# Patient Record
Sex: FEMALE | Race: BLACK OR AFRICAN AMERICAN | Employment: UNEMPLOYED | URBAN - METROPOLITAN AREA
[De-identification: names, ages, dates, MRNs, and addresses within clinical notes are randomized per-mention and may not be internally consistent; named-entity substitution may affect disease eponyms.]

---

## 2018-02-14 ENCOUNTER — IP HISTORICAL/CONVERTED ENCOUNTER (OUTPATIENT)
Dept: OTHER | Age: 53
End: 2018-02-14

## 2019-08-13 ENCOUNTER — ED HISTORICAL/CONVERTED ENCOUNTER (OUTPATIENT)
Dept: OTHER | Age: 54
End: 2019-08-13

## 2020-01-17 ENCOUNTER — ED HISTORICAL/CONVERTED ENCOUNTER (OUTPATIENT)
Dept: OTHER | Age: 55
End: 2020-01-17

## 2022-07-20 ENCOUNTER — OFFICE VISIT (OUTPATIENT)
Dept: OBGYN CLINIC | Age: 57
End: 2022-07-20
Payer: COMMERCIAL

## 2022-07-20 VITALS
TEMPERATURE: 97.1 F | BODY MASS INDEX: 26.63 KG/M2 | OXYGEN SATURATION: 98 % | HEIGHT: 66 IN | WEIGHT: 165.7 LBS | DIASTOLIC BLOOD PRESSURE: 104 MMHG | HEART RATE: 85 BPM | RESPIRATION RATE: 18 BRPM | SYSTOLIC BLOOD PRESSURE: 150 MMHG

## 2022-07-20 DIAGNOSIS — Z12.31 ENCOUNTER FOR SCREENING MAMMOGRAM FOR MALIGNANT NEOPLASM OF BREAST: ICD-10-CM

## 2022-07-20 DIAGNOSIS — Z01.419 ENCOUNTER FOR GYNECOLOGICAL EXAMINATION WITHOUT ABNORMAL FINDING: Primary | ICD-10-CM

## 2022-07-20 DIAGNOSIS — Z11.3 VENEREAL DISEASE SCREENING: ICD-10-CM

## 2022-07-20 DIAGNOSIS — Z72.0 TOBACCO USER: ICD-10-CM

## 2022-07-20 DIAGNOSIS — B96.89 BACTERIAL VAGINOSIS: ICD-10-CM

## 2022-07-20 DIAGNOSIS — N89.8 VAGINAL PRURITUS: ICD-10-CM

## 2022-07-20 DIAGNOSIS — N76.0 BACTERIAL VAGINOSIS: ICD-10-CM

## 2022-07-20 DIAGNOSIS — Z12.4 SCREENING FOR CERVICAL CANCER: ICD-10-CM

## 2022-07-20 DIAGNOSIS — Z12.11 ENCOUNTER FOR SCREENING COLONOSCOPY: ICD-10-CM

## 2022-07-20 PROCEDURE — 99213 OFFICE O/P EST LOW 20 MIN: CPT | Performed by: OBSTETRICS & GYNECOLOGY

## 2022-07-20 RX ORDER — AMLODIPINE BESYLATE 5 MG/1
5 TABLET ORAL DAILY
COMMUNITY

## 2022-07-20 NOTE — PROGRESS NOTES
HPI: Festus Cotton is a 61 y.o. female [id number], menopausal, who presents today for the following:  Chief Complaint   Patient presents with    Annual Exam        She denies abnormal vaginal bleeding, abnormal vaginal discharge or vaginal odor. Mild vaginal/vulvar pruritus. Denies h/o STIs or abnormal pap smears. Last mammogram: 2 yrs ago. Last colonoscopy: 8-9 yrs ago, neg per pt. Past Medical History:   Diagnosis Date    Anxiety     Chest pain     stress Echo  normal May 2013 in [facility]    Disc herniation 06/06/2013    Noted on MRI, C3-C6, assoc with stenosis but no cord compression    HTN (hypertension)     Hypertensive urgency     Migraine        History reviewed. No pertinent surgical history.     Family History   Problem Relation Age of Onset    Cancer Mother         brain    Stroke Mother     Hypertension Mother     Hypertension Father     Cancer Sister     Cancer Sister         breast    Hypertension Sister     Hypertension Sister        Social History     Socioeconomic History    Marital status:      Spouse name: Not on file    Number of children: Not on file    Years of education: Not on file    Highest education level: 12th grade   Occupational History    Occupation:    Tobacco Use    Smoking status: Every Day     Packs/day: 0.25     Years: 20.00     Pack years: 5.00     Types: Cigarettes    Smokeless tobacco: Never   Vaping Use    Vaping Use: Never used   Substance and Sexual Activity    Alcohol use: Yes     Comment: rarely    Drug use: No    Sexual activity: Yes     Partners: Male     Comment: denies h/o STIs or abnml pap smears   Other Topics Concern    Not on file   Social History Narrative    Not on file     Social Determinants of Health     Financial Resource Strain: Not on file   Food Insecurity: Not on file   Transportation Needs: Not on file   Physical Activity: Sufficiently Active    Days of Exercise per Week: 4 days    Minutes of Exercise per Session: 60 min   Stress: Not on file   Social Connections: Not on file   Intimate Partner Violence: Not on file   Housing Stability: Not on file       Allergies   Allergen Reactions    Tetracycline Hives          Current Outpatient Medications:     amLODIPine (NORVASC) 5 mg tablet, Take 5 mg by mouth in the morning., Disp: , Rfl:     aspirin 81 mg chewable tablet, Take 1 Tab by mouth daily. , Disp: 30 Tab, Rfl: 11    metoprolol succinate (TOPROL-XL) 25 mg XL tablet, Take 25 mg by mouth in the morning., Disp: , Rfl:          Review of Systems: Denies issues with eyes, ears, mouth, nose. Denies fevers/chills. 10 lb weight gain in past year. Denies chest pain, shortness of breath, nausea, vomiting, constipation, diarrhea or abdominal pain. Denies dysuria. Denies muscle aches, weakness, numbness or tingling. Denies issues with breasts. Denies bleeding/clotting d/o's. Denies anxiety/depression, S/HI. OBJECTIVE:  BP (!) 150/104 (BP 1 Location: Left upper arm, BP Patient Position: Sitting, BP Cuff Size: Adult)   Pulse 85   Temp 97.1 °F (36.2 °C) (Temporal)   Resp 18   Ht 5' 6\" (1.676 m)   Wt 165 lb 11.2 oz (75.2 kg)   SpO2 98%   BMI 26.74 kg/m²      Constitutional  Appearance: well-nourished, well developed, alert, in no acute distress    HENT  Head and Face: appears normal    Neck  Inspection/Palpation: normal appearance      Breasts  Symmetric, no palpable masses, no tenderness, no skin changes, no nipple abnormality, no nipple discharge, no axillary or supraclavicular lymphadenopathy.     Chest  Respiratory Effort: normal      Gastrointestinal  Abdominal Examination: abdomen non-tender to palpation, no masses present  Liver and spleen: no hepatomegaly present, spleen not palpable      Genitourinary  External Genitalia: normal appearance for age, no discharge present, no tenderness present, no inflammatory lesions present, no masses present; atrophy present  Vagina: normal vaginal vault without central or paravaginal defects, no discharge present, no inflammatory lesions present, no masses present; atrophic  Bladder: non-tender to palpation  Urethra: appears normal  Cervix: normal, no cervical motion tenderness or abnormal discharge; atrophic; pap smear and swab obtained  Uterus: normal size, shape and consistency  Adnexa: no adnexal tenderness present, no adnexal masses present  Perineum: perineum within normal limits, no evidence of trauma, no rashes or skin lesions present  Anus: anus within normal limits, no hemorrhoids present    Skin  General Inspection: no rash, no lesions identified    Neurologic/Psychiatric  Mental Status:  Orientation: grossly oriented to person, place and time  Mood and Affect: mood normal, affect appropriate          Assessment/plan:    ICD-10-CM ICD-9-CM    1. Encounter for gynecological examination without abnormal finding  Z01.419 V72.31       2. Screening for cervical cancer  Z12.4 V76.2 PAP IG, CT-NG-TV, APTIMA HPV AND RFX 68/04,63([id number])      3. Venereal disease screening  Z11.3 V74.5 PAP IG, CT-NG-TV, APTIMA HPV AND RFX 51/61,17([id number])      4. Encounter for screening mammogram for malignant neoplasm of breast  Z12.31 V76.12 [facility] MAMMO BI SCREENING INCL CAD      5. Encounter for screening colonoscopy  Z12.11 V76.51       6. Tobacco user  Z72.0 305.1       7. Vaginal pruritus  N89.8 698.1 NUSWAB VAGINOSIS + CANDIDA           -Annual gynecologic exam.    -Cervical cancer screening- pap smear and HPV co testing obtained today. -Breast cancer screening- breast awareness discussed; mammograms yearly. -STI screening-accepts testing: G/C/T ordered, declines bloodwork.     -Colon cancer screening- colonoscopy up to date per pt, she will verify.     -Bone health-discussed weightbearing exercises, vitamin D and calcium supplementation.    -Vaginal atrophy- discussed vaginal estrogen vs non estrofgen products such as Replens. She will try the latter.     -Tobacco cessation discussed.

## 2022-07-20 NOTE — PROGRESS NOTES
Chief Complaint   Patient presents with    Annual Exam     1. \"Have you been to the ER, urgent care clinic since your last visit? Hospitalized since your last visit? \" Yes Where: [place]    2. \"Have you seen or consulted any other health care providers outside of the [address] [place] [address] since your last visit? \" No     3. For patients aged 39-70: Has the patient had a colonoscopy? Yes - no Care Gap present     If the patient is female:    4. For patients aged 41-77: Has the patient had a mammogram within the past 2 years? Yes - Care Gap present. Rooming MA/LPN to request most recent results    5. For patients aged 21-65: Has the patient had a pap smear?  Yes - no Care Gap present    Visit Vitals  BP (!) 150/104 (BP 1 Location: Left upper arm, BP Patient Position: Sitting, BP Cuff Size: Adult)   Pulse 85   Temp 97.1 °F (36.2 °C) (Temporal)   Resp 18   Ht 5' 6\" (1.676 m)   Wt 165 lb 11.2 oz (75.2 kg)   SpO2 98%   BMI 26.74 kg/m²

## 2022-07-22 LAB
A VAGINAE DNA VAG QL NAA+PROBE: ABNORMAL SCORE
BVAB2 DNA VAG QL NAA+PROBE: ABNORMAL SCORE
C ALBICANS DNA VAG QL NAA+PROBE: NEGATIVE
C GLABRATA DNA VAG QL NAA+PROBE: NEGATIVE
MEGA1 DNA VAG QL NAA+PROBE: ABNORMAL SCORE

## 2022-07-23 LAB
C TRACH RRNA CVX QL NAA+PROBE: NEGATIVE
CYTOLOGIST CVX/VAG CYTO: NORMAL
CYTOLOGY CVX/VAG DOC CYTO: NORMAL
CYTOLOGY CVX/VAG DOC THIN PREP: NORMAL
DX ICD CODE: NORMAL
HPV I/H RISK 4 DNA CVX QL PROBE+SIG AMP: NEGATIVE
Lab: NORMAL
N GONORRHOEA RRNA CVX QL NAA+PROBE: NEGATIVE
OTHER STN SPEC: NORMAL
STAT OF ADQ CVX/VAG CYTO-IMP: NORMAL
T VAGINALIS RRNA SPEC QL NAA+PROBE: NEGATIVE

## 2022-07-26 RX ORDER — METOPROLOL SUCCINATE 25 MG/1
25 TABLET, EXTENDED RELEASE ORAL DAILY
COMMUNITY
Start: 2022-07-13

## 2022-07-29 PROBLEM — B96.89 BACTERIAL VAGINOSIS: Status: ACTIVE | Noted: 2022-07-29

## 2022-07-29 PROBLEM — N76.0 BACTERIAL VAGINOSIS: Status: ACTIVE | Noted: 2022-07-29

## 2022-07-29 RX ORDER — METRONIDAZOLE 500 MG/1
500 TABLET ORAL EVERY 12 HOURS
Qty: 14 TABLET | Refills: 1 | Status: SHIPPED | OUTPATIENT
Start: 2022-07-29 | End: 2022-08-05

## 2022-07-29 RX ORDER — FLUCONAZOLE 150 MG/1
TABLET ORAL
Qty: 1 TABLET | Refills: 1 | Status: SHIPPED | OUTPATIENT
Start: 2022-07-29

## 2022-07-30 NOTE — PROGRESS NOTES
PORTAL MESSAGE  Lenin Berkowitz,     Your pap smear is negative. Testing for gonorrhea, chlamydia and trichomonas came back negative. Your vaginal swab is showing bacterial vaginosis. I will send in Metronidazole and Fluconazole to treat this.      Thank you,  Dr. Lynne Wu.

## 2022-08-04 ENCOUNTER — HOSPITAL ENCOUNTER (OUTPATIENT)
Dept: MAMMOGRAPHY | Age: 57
Discharge: HOME | End: 2022-08-04
Attending: OBSTETRICS & GYNECOLOGY
Payer: COMMERCIAL

## 2022-08-04 DIAGNOSIS — Z12.31 ENCOUNTER FOR SCREENING MAMMOGRAM FOR MALIGNANT NEOPLASM OF BREAST: ICD-10-CM

## 2022-08-04 PROCEDURE — 77063 BREAST TOMOSYNTHESIS BI: CPT

## 2022-10-26 ENCOUNTER — HOSPITAL ENCOUNTER (EMERGENCY)
Age: 57
Discharge: HOME | End: 2022-10-26
Payer: COMMERCIAL

## 2022-10-26 ENCOUNTER — APPOINTMENT (OUTPATIENT)
Dept: GENERAL RADIOLOGY | Age: 57
End: 2022-10-26
Attending: PHYSICIAN ASSISTANT
Payer: COMMERCIAL

## 2022-10-26 VITALS
HEART RATE: 96 BPM | WEIGHT: 160 LBS | DIASTOLIC BLOOD PRESSURE: 102 MMHG | SYSTOLIC BLOOD PRESSURE: 158 MMHG | BODY MASS INDEX: 25.71 KG/M2 | OXYGEN SATURATION: 100 % | TEMPERATURE: 98.7 F | HEIGHT: 66 IN | RESPIRATION RATE: 16 BRPM

## 2022-10-26 DIAGNOSIS — M54.12 CERVICAL RADICULOPATHY: ICD-10-CM

## 2022-10-26 DIAGNOSIS — M62.838 MUSCLE SPASM: ICD-10-CM

## 2022-10-26 DIAGNOSIS — M50.30 DDD (DEGENERATIVE DISC DISEASE), CERVICAL: Primary | ICD-10-CM

## 2022-10-26 PROCEDURE — 72050 X-RAY EXAM NECK SPINE 4/5VWS: CPT

## 2022-10-26 PROCEDURE — 74011250637 HC RX REV CODE- 250/637: Performed by: PHYSICIAN ASSISTANT

## 2022-10-26 PROCEDURE — 99283 EMERGENCY DEPT VISIT LOW MDM: CPT

## 2022-10-26 RX ORDER — HYDROCODONE BITARTRATE AND ACETAMINOPHEN 5; 325 MG/1; MG/1
1 TABLET ORAL
Qty: 8 TABLET | Refills: 0 | Status: SHIPPED | OUTPATIENT
Start: 2022-10-26 | End: 2022-10-29

## 2022-10-26 RX ORDER — METHOCARBAMOL 750 MG/1
750 TABLET, FILM COATED ORAL
Status: COMPLETED | OUTPATIENT
Start: 2022-10-26 | End: 2022-10-26

## 2022-10-26 RX ORDER — IBUPROFEN 800 MG/1
800 TABLET ORAL ONCE
Status: COMPLETED | OUTPATIENT
Start: 2022-10-26 | End: 2022-10-26

## 2022-10-26 RX ORDER — METHOCARBAMOL 750 MG/1
750 TABLET, FILM COATED ORAL 4 TIMES DAILY
Qty: 20 TABLET | Refills: 0 | Status: SHIPPED | OUTPATIENT
Start: 2022-10-26 | End: 2022-10-31

## 2022-10-26 RX ADMIN — METHOCARBAMOL 750 MG: 750 TABLET ORAL at 21:17

## 2022-10-26 RX ADMIN — IBUPROFEN 800 MG: 800 TABLET, FILM COATED ORAL at 21:17

## 2022-10-26 NOTE — ED TRIAGE NOTES
Patient having pain in back of head that radiates down neck and L side of jaw X2 weeks.  Been taking tylenol at home with no relief

## 2022-10-26 NOTE — Clinical Note
[address] [facility] EMERGENCY DEPT  [facility] [place] [address]  [phone]    Work/School Note    Date: 10/26/2022    To Whom It May concern:    Heber Marinelli was seen and treated today in the emergency room by the following provider(s):  Physician Assistant: Suresh Wilson PA-C. Heber Marinelli is excused from work/school on 10/26/22 and 10/27/22. She is medically clear to return to work/school on 10/28/2022.        Sincerely,          Jaida Villasenor PA-C

## 2022-10-27 NOTE — ED PROVIDER NOTES
EMERGENCY DEPARTMENT HISTORY AND PHYSICAL EXAM      Date: 10/26/2022  Patient Name: Tanika Soares    History of Presenting Illness     Chief Complaint   Patient presents with    Headache       History Provided By: Patient    HPI: Tanika Soares, 61 y.o. female with a past medical history significant for HTN, migraines, and anxiety who presents to this ED with cc of neck pain. Patient reports chronic neck pain which has been worse over the past 7 days. Denies any precipitating injury or trauma and states that she woke up with left-sided pain and tightness. She also complains of developing a gradual onset generalized headache over the past 2 to 3 days. Patient denies treating pain with anything today. Notes no alleviating or exacerbating factors of pain. States that she otherwise well has no further concerns. Patient still denies any fevers, chills, change in vision, lightheadedness, dizziness, numbness, weakness, chest pain, shortness of breath    There are no other complaints, changes, or physical findings at this time. PCP: Ha Rogel MD    No current facility-administered medications on file prior to encounter. Current Outpatient Medications on File Prior to Encounter   Medication Sig Dispense Refill    fluconazole (DIFLUCAN) 150 mg tablet Take 1 tablet by mouth x1 dose to prevent a yeast infection after completing the antibiotics. Repeat dose in 72 hours if symptoms continue. Indications: treatment to prevent vulvovaginal yeast infection 1 Tablet 1    metoprolol succinate (TOPROL-XL) 25 mg XL tablet Take 25 mg by mouth in the morning. amLODIPine (NORVASC) 5 mg tablet Take 5 mg by mouth in the morning. aspirin 81 mg chewable tablet Take 1 Tab by mouth daily.  30 Tab 11       Past History     Past Medical History:  Past Medical History:   Diagnosis Date    Anxiety     Chest pain     stress Echo  normal May 2013 in [facility]    Disc herniation 06/06/2013    Noted on MRI, C3-C6, assoc with stenosis but no cord compression    HTN (hypertension)     Hypertensive urgency     Menopause     Migraine        Past Surgical History:  No past surgical history on file. Family History:  Family History   Problem Relation Age of Onset    Cancer Mother         brain    Stroke Mother     Hypertension Mother     Breast Cancer Sister     Breast Cancer Sister     Cancer Sister     Cancer Sister         breast    Hypertension Sister     Hypertension Sister     Hypertension Father        Social History:  Social History     Tobacco Use    Smoking status: Every Day     Packs/day: 0.25     Years: 20.00     Pack years: 5.00     Types: Cigarettes    Smokeless tobacco: Never   Vaping Use    Vaping Use: Never used   Substance Use Topics    Alcohol use: Yes     Comment: rarely    Drug use: No       Allergies: Allergies   Allergen Reactions    Tetracycline Hives       Review of Systems   Review of Systems   Constitutional: Negative. Negative for chills, diaphoresis and fever. HENT: Negative. Negative for congestion, rhinorrhea and sore throat. Eyes: Negative. Respiratory: Negative. Negative for cough, shortness of breath and wheezing. Cardiovascular: Negative. Negative for chest pain and palpitations. Gastrointestinal: Negative. Negative for abdominal pain, diarrhea, nausea and vomiting. Genitourinary: Negative. Negative for difficulty urinating, dysuria, flank pain, frequency and hematuria. Musculoskeletal:  Positive for neck pain. Skin: Negative. Negative for rash. Neurological:  Positive for headaches. Negative for dizziness, syncope, weakness, light-headedness and numbness. Psychiatric/Behavioral: Negative. All other systems reviewed and are negative. Physical Exam   Physical Exam  Vitals and nursing note reviewed. Constitutional:       General: She is not in acute distress. Appearance: Normal appearance. She is not ill-appearing or toxic-appearing.    HENT:      Head: Normocephalic and atraumatic. Mouth/Throat:      Mouth: Mucous membranes are moist.      Pharynx: Oropharynx is clear. Eyes:      Extraocular Movements: Extraocular movements intact. Conjunctiva/sclera: Conjunctivae normal.      Pupils: Pupils are equal, round, and reactive to light. Pulmonary:      Effort: Pulmonary effort is normal.      Breath sounds: Normal breath sounds. No wheezing, rhonchi or rales. Abdominal:      General: There is no distension. Palpations: Abdomen is soft. Tenderness: There is no abdominal tenderness. There is no guarding or rebound. Musculoskeletal:      Cervical back: Neck supple. Tenderness (left sided paraspinal musculature with palpable trigger points) present. No deformity or bony tenderness. Thoracic back: Normal.      Lumbar back: Normal.      Comments: No step-offs or deformity   Skin:     General: Skin is warm and dry. Capillary Refill: Capillary refill takes less than 2 seconds. Findings: No rash. Neurological:      General: No focal deficit present. Mental Status: She is alert and oriented to person, place, and time. Sensory: Sensation is intact. Motor: Motor function is intact. Gait: Gait is intact. Psychiatric:         Mood and Affect: Mood normal.         Behavior: Behavior normal.       Lab and Diagnostic Study Results   Labs -   No results found for this or any previous visit (from the past 12 hour(s)).     Radiologic Studies -   @lastxrresult@  CT Results  (Last 48 hours)      None          CXR Results  (Last 48 hours)      None            Medical Decision Making and ED Course   Differential Diagnosis & Medical Decision Making Provider Note:   Ddx: Strain, sprain, degenerative disc disease, muscle spasm, cervical radiculopathy, tension headache    Will assess with plain films, control pain, reassess, anticipate discharge with close outpatient orthopedic follow-up          - I am the first provider for this patient. I reviewed the vital signs, available nursing notes, past medical history, past surgical history, family history and social history. The patients presenting problems have been discussed, and they are in agreement with the care plan formulated and outlined with them. I have encouraged them to ask questions as they arise throughout their visit. Vital Signs-Reviewed the patient's vital signs. Patient Vitals for the past 12 hrs:   Temp Pulse Resp BP SpO2   10/26/22 2244 -- -- -- -- 100 %   10/26/22 2230 -- -- -- (!) 158/102 98 %   10/26/22 2145 -- -- -- (!) 140/94 98 %   10/26/22 2130 -- -- -- (!) 157/108 100 %   10/26/22 2037 -- -- -- -- 100 %   10/26/22 2030 -- -- -- (!) 145/102 100 %   10/26/22 2015 -- -- -- (!) 158/106 100 %   10/26/22 1945 -- -- -- (!) 183/110 100 %   10/26/22 1923 98.7 °F (37.1 °C) 96 16 (!) 205/119 100 %       ED Course:   10:25 PM  Patient reassessed and updated on imaging results which do demonstrate degenerative disc disease with left-sided neuroforaminal stenosis worst at C5-C6. Patient states that her pain is better controlled this time. She has been encouraged to schedule close follow-up with orthopedic spine specialist; referral provided at time of discharge. Patient is also educated on strict return precautions and conveys good understanding agree with care plan as outlined. She has no new complaints or concerns and all questions have been answered. Dissipate discharge home shortly. TOBACCO COUNSELING: Upon evaluation, pt expressed that they are a current tobacco user. For approximately 10 minutes, pt has been counseled on the dangers of smoking and was encouraged to quit as soon as possible in order to decrease further risks to their health. Pt has conveyed their understanding of the risks involved should they continue to use tobacco products.   Procedures   Performed by: Waqar Seo PA-C  Procedures      Disposition   Disposition: DC- Adult Discharges: All of the diagnostic tests were reviewed and questions answered. Diagnosis, care plan and treatment options were discussed. The patient understands the instructions and will follow up as directed. The patients results have been reviewed with them. They have been counseled regarding their diagnosis. The patient verbally convey understanding and agreement of the signs, symptoms, diagnosis, treatment and prognosis and additionally agrees to follow up as recommended with their PCP in 24 - 48 hours. They also agree with the care-plan and convey that all of their questions have been answered. I have also put together some discharge instructions for them that include: 1) educational information regarding their diagnosis, 2) how to care for their diagnosis at home, as well a 3) list of reasons why they would want to return to the ED prior to their follow-up appointment, should their condition change. DISCHARGE PLAN:  1. Current Discharge Medication List        CONTINUE these medications which have NOT CHANGED    Details   fluconazole (DIFLUCAN) 150 mg tablet Take 1 tablet by mouth x1 dose to prevent a yeast infection after completing the antibiotics. Repeat dose in 72 hours if symptoms continue. Indications: treatment to prevent vulvovaginal yeast infection  Qty: 1 Tablet, Refills: 1    Associated Diagnoses: Bacterial vaginosis      metoprolol succinate (TOPROL-XL) 25 mg XL tablet Take 25 mg by mouth in the morning. amLODIPine (NORVASC) 5 mg tablet Take 5 mg by mouth in the morning. aspirin 81 mg chewable tablet Take 1 Tab by mouth daily. Qty: 30 Tab, Refills: 11           2.    Follow-up Information       Follow up With Specialties Details Why Contact Info    Milagro Diego MD Orthopedic Surgery Schedule an appointment as soon as possible for a visit    [address]  [phone]      [facility] EMERGENCY DEPT Emergency Medicine  If symptoms worsen [address] [facility] [id number] [phone]          3. Return to ED if worse   4. Discharge Medication List as of 10/26/2022 10:40 PM        START taking these medications    Details   methocarbamoL (Robaxin-750) 750 mg tablet Take 1 Tablet by mouth four (4) times daily for 5 days. , Normal, Disp-20 Tablet, R-0      HYDROcodone-acetaminophen (Norco) 5-325 mg per tablet Take 1 Tablet by mouth every six (6) hours as needed for Pain for up to 3 days. Max Daily Amount: 4 Tablets., Normal, Disp-8 Tablet, R-0           CONTINUE these medications which have NOT CHANGED    Details   fluconazole (DIFLUCAN) 150 mg tablet Take 1 tablet by mouth x1 dose to prevent a yeast infection after completing the antibiotics. Repeat dose in 72 hours if symptoms continue. Indications: treatment to prevent vulvovaginal yeast infection, Normal, Disp-1 Tablet, R-1      metoprolol succinate (TOPROL-XL) 25 mg XL tablet Take 25 mg by mouth in the morning., Historical Med      amLODIPine (NORVASC) 5 mg tablet Take 5 mg by mouth in the morning., Historical Med      aspirin 81 mg chewable tablet Take 1 Tab by mouth daily. , Normal, Disp-30 Tab, R-11           Remove if admitted/transferred    Diagnosis/Clinical Impression     Clinical Impression:   1. DDD (degenerative disc disease), cervical    2. Cervical radiculopathy    3. Muscle spasm        Attestations: Idalmis Villasenor PA-C, am the primary clinician of record. Please note that this dictation was completed with [employer], the computer voice recognition software. Quite often unanticipated grammatical, syntax, homophones, and other interpretive errors are inadvertently transcribed by the computer software. Please disregard these errors. Please excuse any errors that have escaped final proofreading. Thank you.

## 2023-01-05 ENCOUNTER — HOSPITAL ENCOUNTER (EMERGENCY)
Age: 58
Discharge: HOME | End: 2023-01-05
Attending: STUDENT IN AN ORGANIZED HEALTH CARE EDUCATION/TRAINING PROGRAM
Payer: COMMERCIAL

## 2023-01-05 ENCOUNTER — APPOINTMENT (OUTPATIENT)
Dept: CT IMAGING | Age: 58
End: 2023-01-05
Payer: COMMERCIAL

## 2023-01-05 VITALS
HEART RATE: 85 BPM | TEMPERATURE: 98.2 F | HEIGHT: 66 IN | SYSTOLIC BLOOD PRESSURE: 144 MMHG | DIASTOLIC BLOOD PRESSURE: 86 MMHG | RESPIRATION RATE: 18 BRPM | WEIGHT: 157 LBS | BODY MASS INDEX: 25.23 KG/M2 | OXYGEN SATURATION: 100 %

## 2023-01-05 DIAGNOSIS — R51.9 NONINTRACTABLE EPISODIC HEADACHE, UNSPECIFIED HEADACHE TYPE: Primary | ICD-10-CM

## 2023-01-05 PROCEDURE — 99284 EMERGENCY DEPT VISIT MOD MDM: CPT

## 2023-01-05 PROCEDURE — 96374 THER/PROPH/DIAG INJ IV PUSH: CPT

## 2023-01-05 PROCEDURE — 36415 COLL VENOUS BLD VENIPUNCTURE: CPT

## 2023-01-05 PROCEDURE — 70450 CT HEAD/BRAIN W/O DYE: CPT

## 2023-01-05 PROCEDURE — 74011250636 HC RX REV CODE- 250/636

## 2023-01-05 PROCEDURE — 96375 TX/PRO/DX INJ NEW DRUG ADDON: CPT

## 2023-01-05 RX ORDER — KETOROLAC TROMETHAMINE 15 MG/ML
15 INJECTION, SOLUTION INTRAMUSCULAR; INTRAVENOUS
Status: COMPLETED | OUTPATIENT
Start: 2023-01-05 | End: 2023-01-05

## 2023-01-05 RX ORDER — DIPHENHYDRAMINE HYDROCHLORIDE 50 MG/ML
25 INJECTION, SOLUTION INTRAMUSCULAR; INTRAVENOUS
Status: COMPLETED | OUTPATIENT
Start: 2023-01-05 | End: 2023-01-05

## 2023-01-05 RX ORDER — BUTALBITAL, ACETAMINOPHEN AND CAFFEINE 50; 325; 40 MG/1; MG/1; MG/1
1 TABLET ORAL
Qty: 12 TABLET | Refills: 0 | Status: SHIPPED | OUTPATIENT
Start: 2023-01-05 | End: 2023-01-06 | Stop reason: ALTCHOICE

## 2023-01-05 RX ORDER — PROCHLORPERAZINE EDISYLATE 5 MG/ML
10 INJECTION INTRAMUSCULAR; INTRAVENOUS
Status: COMPLETED | OUTPATIENT
Start: 2023-01-05 | End: 2023-01-05

## 2023-01-05 RX ADMIN — KETOROLAC TROMETHAMINE 15 MG: 15 INJECTION, SOLUTION INTRAMUSCULAR; INTRAVENOUS at 18:36

## 2023-01-05 RX ADMIN — PROCHLORPERAZINE EDISYLATE 10 MG: 5 INJECTION INTRAMUSCULAR; INTRAVENOUS at 17:21

## 2023-01-05 RX ADMIN — DIPHENHYDRAMINE HYDROCHLORIDE 25 MG: 50 INJECTION INTRAMUSCULAR; INTRAVENOUS at 17:21

## 2023-01-05 RX ADMIN — SODIUM CHLORIDE 1000 ML: 9 INJECTION, SOLUTION INTRAVENOUS at 17:20

## 2023-01-05 NOTE — ED PROVIDER NOTES
EMERGENCY DEPARTMENT HISTORY AND PHYSICAL EXAM      Date: 1/5/2023  Patient Name: Delicia Collins    History of Presenting Illness     Chief Complaint   Patient presents with    Headache       History Provided By: Patient    HPI: Delicia Collins, 61 y.o. female with a history of anxiety, cervical disc disease, hypertension, and migraines presents with headache. Patient reports \"burning throbbing\" right-sided headache that is somewhat similar to her previous headaches in the past.  Gradual atraumatic onset 4 weeks ago. Pain has been intermittent up until 2 days ago when it became constant and unrelieved with acetaminophen or cold compresses. Patient denies taking triptans. She denies worsening or alleviating factors. No increased pain with exertion or Valsalva. No change in pain based on time of day. No associated visual disturbance, facial droop, dysphagia, neck pain, dizziness, or syncope. She denies fever, nausea, vomiting, diarrhea, abdominal pain, chest pain, difficulty breathing, back pain, imbalance, tremors, or cognitive decline. There are no other complaints, changes, or physical findings at this time. Past History     Past Medical History:  Past Medical History:   Diagnosis Date    Anxiety     Chest pain     stress Echo  normal May 2013 in [facility]    Disc herniation 06/06/2013    Noted on MRI, C3-C6, assoc with stenosis but no cord compression    HTN (hypertension)     Hypertensive urgency     Menopause     Migraine        Past Surgical History:  No past surgical history on file.     Family History:  Family History   Problem Relation Age of Onset    Cancer Mother         brain    Stroke Mother     Hypertension Mother     Breast Cancer Sister     Breast Cancer Sister     Cancer Sister     Cancer Sister         breast    Hypertension Sister     Hypertension Sister     Hypertension Father        Social History:  Social History     Tobacco Use    Smoking status: Every Day     Packs/day: 0.25 Years: 20.00     Pack years: 5.00     Types: Cigarettes    Smokeless tobacco: Never   Vaping Use    Vaping Use: Never used   Substance Use Topics    Alcohol use: Never    Drug use: No       Allergies: Allergies   Allergen Reactions    Tetracycline Hives       PCP: JON Moyer    No current facility-administered medications on file prior to encounter. Current Outpatient Medications on File Prior to Encounter   Medication Sig Dispense Refill    metoprolol succinate (TOPROL-XL) 25 mg XL tablet Take 25 mg by mouth in the morning. amLODIPine (NORVASC) 5 mg tablet Take 5 mg by mouth in the morning. aspirin 81 mg chewable tablet Take 1 Tab by mouth daily. 30 Tab 11       Review of Systems   Review of Systems   Constitutional: Negative. Negative for chills, fatigue and fever. HENT:  Negative for congestion, dental problem, rhinorrhea, sinus pressure, sore throat, tinnitus and trouble swallowing. Eyes: Negative. Negative for photophobia, pain, discharge and visual disturbance. Respiratory: Negative. Negative for cough and shortness of breath. Cardiovascular: Negative. Negative for chest pain and palpitations. Gastrointestinal: Negative. Negative for abdominal pain, diarrhea, nausea and vomiting. Endocrine: Negative. Genitourinary: Negative. Negative for decreased urine volume, dysuria and flank pain. Musculoskeletal: Negative. Negative for back pain, myalgias, neck pain and neck stiffness. Skin: Negative. Negative for rash. Allergic/Immunologic: Negative. Neurological:  Positive for headaches. Negative for dizziness, tremors, seizures, syncope, facial asymmetry, speech difficulty, weakness, light-headedness and numbness. Hematological: Negative. Negative for adenopathy. Psychiatric/Behavioral: Negative. Negative for confusion. Physical Exam   Physical Exam  Vitals and nursing note reviewed. Constitutional:       General: She is in acute distress. Appearance: She is not ill-appearing. HENT:      Head: Normocephalic. Right Ear: Tympanic membrane, ear canal and external ear normal.      Left Ear: Tympanic membrane, ear canal and external ear normal.      Nose: No congestion or rhinorrhea. Mouth/Throat:      Mouth: Mucous membranes are moist.      Pharynx: Oropharynx is clear. No posterior oropharyngeal erythema. Eyes:      Extraocular Movements: Extraocular movements intact. Conjunctiva/sclera: Conjunctivae normal.      Pupils: Pupils are equal, round, and reactive to light. Neck:      Vascular: No carotid bruit. Cardiovascular:      Rate and Rhythm: Normal rate and regular rhythm. Pulses: Normal pulses. Heart sounds: Normal heart sounds. No murmur heard. Pulmonary:      Effort: Pulmonary effort is normal. No respiratory distress. Breath sounds: Normal breath sounds. Abdominal:      General: Bowel sounds are normal.      Palpations: Abdomen is soft. Tenderness: There is no abdominal tenderness. There is no right CVA tenderness, left CVA tenderness or guarding. Musculoskeletal:         General: Normal range of motion. Cervical back: Normal range of motion and neck supple. No rigidity or tenderness. Lymphadenopathy:      Cervical: No cervical adenopathy. Skin:     General: Skin is warm and dry. Findings: No lesion. Neurological:      General: No focal deficit present. Mental Status: She is alert and oriented to person, place, and time. Cranial Nerves: No cranial nerve deficit. Sensory: No sensory deficit. Motor: No weakness. Coordination: Coordination normal.      Gait: Gait normal.      Deep Tendon Reflexes: Reflexes normal.   Psychiatric:         Mood and Affect: Mood normal.       Lab and Diagnostic Study Results   Labs -   No results found for this or any previous visit (from the past 12 hour(s)).     Radiologic Studies -   @lastxrresult@  CT Results  (Last 48 hours) 01/05/23 1646  CT HEAD WO CONT Final result    Impression:  No acute intracranial hemorrhage, mass or infarct. Narrative:  INDICATION: Prolonged headache with suspected history of frontal mass? Exam: Noncontrast CT of the brain is performed with 5 mm collimation. CT dose reduction was achieved with the use of the standardized protocol   tailored for this examination and automatic exposure control for dose   modulation. Direct comparison is made to prior MRI dated August 2013. FINDINGS: There is no acute intracranial hemorrhage, mass, mass effect or   herniation. Ventricular system is normal. The gray-white matter differentiation   is well-preserved. The mastoid air cells are well pneumatized. The visualized   paranasal sinuses are normal.                 CXR Results  (Last 48 hours)      None            Medical Decision Making and ED Course   Differential Diagnosis & Medical Decision Making Provider Note:     - I am the first provider for this patient. I reviewed the vital signs, available nursing notes, past medical history, past surgical history, family history and social history. The patients presenting problems have been discussed, and they are in agreement with the care plan formulated and outlined with them. I have encouraged them to ask questions as they arise throughout their visit. Vital Signs-Reviewed the patient's vital signs. Vitals:    01/05/23 1413 01/05/23 1415 01/05/23 1545   BP: (!) 144/86 (!) 144/86    Pulse: 95 85    Resp: 16 18    Temp: 98.2 °F (36.8 °C) 98.2 °F (36.8 °C)    SpO2: 100% 100% 100%   Weight: 72.6 kg (160 lb) 71.2 kg (157 lb)    Height: 5' 6\" (1.676 m) 5' 6\" (1.676 m)        ED Course:   ED Course as of 01/07/23 1247   Thu Jan 05, 2023   1616 Chart review complete. [IM]   5870 58-year-old female presents with prolonged headache. She is sitting up in hallway chair, visibly uncomfortable.   Differentials include migraine, dehydration, CVA, space-occupying lesion, temporal arteritis, CO poisoning. Will rehydrate, administer dopamine antagonist and Benadryl, and CT. Reassess. [MB]   6022 CT completed due to history of breast Ca and prolonged nature of pain with change in consistency. [KW]   [id number] Patient reassessed and reports that her pain is markedly improved. She is tolerating p.o. challenge without difficulty. Patient requesting to be discharged at this time. Work-up unremarkable including negative head CT. Patient encouraged to follow-up with her primary care should her condition persist, return ED sooner if worse. She has been educated on strict return precautions and conveys good understanding and agreement care plan as outlined. [OMERO]      ED Course User Index  [OMERO] Lilly Villasenor PA-C  [KW] Sim Monae NP         Procedures   Performed by: Gwendolyn Warren NP  Procedures      Disposition   Disposition: DC- Adult Discharges: All of the diagnostic tests were reviewed and questions answered. Diagnosis, care plan and treatment options were discussed. The patient understands the instructions and will follow up as directed. The patients results have been reviewed with them. They have been counseled regarding their diagnosis. The patient verbally convey understanding and agreement of the signs, symptoms, diagnosis, treatment and prognosis and additionally agrees to follow up as recommended with their PCP in 24 - 48 hours. They also agree with the care-plan and convey that all of their questions have been answered. I have also put together some discharge instructions for them that include: 1) educational information regarding their diagnosis, 2) how to care for their diagnosis at home, as well a 3) list of reasons why they would want to return to the ED prior to their follow-up appointment, should their condition change. DISCHARGE PLAN:  1.    Current Discharge Medication List        CONTINUE these medications which have NOT CHANGED    Details   fluconazole (DIFLUCAN) 150 mg tablet Take 1 tablet by mouth x1 dose to prevent a yeast infection after completing the antibiotics. Repeat dose in 72 hours if symptoms continue. Indications: treatment to prevent vulvovaginal yeast infection  Qty: 1 Tablet, Refills: 1    Associated Diagnoses: Bacterial vaginosis      metoprolol succinate (TOPROL-XL) 25 mg XL tablet Take 25 mg by mouth in the morning. amLODIPine (NORVASC) 5 mg tablet Take 5 mg by mouth in the morning. aspirin 81 mg chewable tablet Take 1 Tab by mouth daily. Qty: 30 Tab, Refills: 11           2. Follow-up Information       Follow up With Specialties Details Why Gorge Carrasco MD Family Medicine Schedule an appointment as soon as possible for a visit   [place] [id number]  [address] [facility] [address] [phone]      [address] [place] [address] DEPT Emergency Medicine  If symptoms worsen [address] [facility] [address] [phone]          3. Return to ED if worse   4. Discharge Medication List as of 1/5/2023  7:39 PM        START taking these medications    Details   butalbital-acetaminophen-caffeine (FIORICET, ESGIC) -40 mg per tablet Take 1 Tablet by mouth every six (6) hours as needed for Headache., Normal, Disp-12 Tablet, R-0           CONTINUE these medications which have NOT CHANGED    Details   fluconazole (DIFLUCAN) 150 mg tablet Take 1 tablet by mouth x1 dose to prevent a yeast infection after completing the antibiotics. Repeat dose in 72 hours if symptoms continue. Indications: treatment to prevent vulvovaginal yeast infection, Normal, Disp-1 Tablet, R-1      metoprolol succinate (TOPROL-XL) 25 mg XL tablet Take 25 mg by mouth in the morning., Historical Med      amLODIPine (NORVASC) 5 mg tablet Take 5 mg by mouth in the morning., Historical Med      aspirin 81 mg chewable tablet Take 1 Tab by mouth daily. , Normal, Disp-30 Tab, R-11 Diagnosis/Clinical Impression     Clinical Impression:   1. Nonintractable episodic headache, unspecified headache type        Attestations: Lino SALGADO NP, am the primary clinician of record. Please note that this dictation was completed with [employer], the [employer] voice recognition software. Quite often unanticipated grammatical, syntax, homophones, and other interpretive errors are inadvertently transcribed by the computer software. Please disregard these errors. Please excuse any errors that have escaped final proofreading. Thank you.

## 2023-01-05 NOTE — Clinical Note
[address] [facility] EMERGENCY DEPT  [address] [place] [id number]  [phone]    Work/School Note    Date: 1/5/2023    To Whom It May concern:    Fredi Vines was seen and treated today in the emergency room by the following provider(s):  Attending Provider: Olga Stuart MD  Nurse Practitioner: Luz Hodgson NP  Physician Assistant: Arpit Barron PA-C. Fredi Vines is excused from work/school on 01/05/23 and 01/06/23. She is medically clear to return to work/school on 1/7/2023.        Sincerely,          Gabriela Villasenor PA-C

## 2023-01-05 NOTE — ED TRIAGE NOTES
Reports off and on headache for weeks. Denies sensitivity to light. Reports vision changes, complaining of pain to back of neck. Reports nausea but no vomiting.

## 2023-01-06 ENCOUNTER — OFFICE VISIT (OUTPATIENT)
Dept: PRIMARY CARE CLINIC | Age: 58
End: 2023-01-06
Payer: COMMERCIAL

## 2023-01-06 DIAGNOSIS — L30.9 ECZEMA, UNSPECIFIED TYPE: ICD-10-CM

## 2023-01-06 DIAGNOSIS — G44.019 EPISODIC CLUSTER HEADACHE, NOT INTRACTABLE: ICD-10-CM

## 2023-01-06 DIAGNOSIS — Z12.11 SCREENING FOR MALIGNANT NEOPLASM OF COLON: ICD-10-CM

## 2023-01-06 DIAGNOSIS — I10 PRIMARY HYPERTENSION: ICD-10-CM

## 2023-01-06 DIAGNOSIS — Z11.59 ENCOUNTER FOR HEPATITIS C SCREENING TEST FOR LOW RISK PATIENT: ICD-10-CM

## 2023-01-06 DIAGNOSIS — Z12.31 BREAST CANCER SCREENING BY MAMMOGRAM: Primary | ICD-10-CM

## 2023-01-06 PROCEDURE — 3078F DIAST BP <80 MM HG: CPT

## 2023-01-06 PROCEDURE — 3074F SYST BP LT 130 MM HG: CPT

## 2023-01-06 PROCEDURE — 99204 OFFICE O/P NEW MOD 45 MIN: CPT

## 2023-01-06 RX ORDER — RIZATRIPTAN BENZOATE 10 MG/1
10 TABLET, ORALLY DISINTEGRATING ORAL
Qty: 12 TABLET | Refills: 1 | Status: SHIPPED | OUTPATIENT
Start: 2023-01-06 | End: 2023-01-06

## 2023-01-06 RX ORDER — MAG HYDROX/ALUMINUM HYD/SIMETH 200-200-20
SUSPENSION, ORAL (FINAL DOSE FORM) ORAL 2 TIMES DAILY
Qty: 30 G | Refills: 0 | Status: SHIPPED | OUTPATIENT
Start: 2023-01-06

## 2023-01-06 NOTE — PROGRESS NOTES
Sonam Landaverde is a 61 y.o. female who presents to the office today for the following:    Chief Complaint   Patient presents with    Headache     C/O headache for 3 weeks. Has had this one since Tuesday     Hypertension    Colon Cancer Screening     Wants to have screening scheduled        Past Medical History:   Diagnosis Date    Anxiety     Chest pain     stress Echo  normal May 2013 in [facility]    Disc herniation 06/06/2013    Noted on MRI, C3-C6, assoc with stenosis but no cord compression    HTN (hypertension)     Hypertensive urgency     Menopause     Migraine        History reviewed. No pertinent surgical history. Family History   Problem Relation Age of Onset    Cancer Mother         brain    Stroke Mother     Hypertension Mother     Breast Cancer Sister     Breast Cancer Sister     Cancer Sister     Cancer Sister         breast    Hypertension Sister     Hypertension Sister     Hypertension Father         Social History     Tobacco Use    Smoking status: Every Day     Packs/day: 0.25     Years: 20.00     Pack years: 5.00     Types: Cigarettes    Smokeless tobacco: Never   Vaping Use    Vaping Use: Never used   Substance Use Topics    Alcohol use: Never    Drug use: No        HPI  Patient here to establish as new patient with health care provider and for acute onset of migraine affecting left side of head for 3 weeks with PMH HTN and cervical stenosis of spine. Patient denies previous history of migraines. States that there is some improvement with sleep, however headache is present upon awakening in the morning Intermittent dizziness, photophobia, phonophobia, and nausea reported. Denies gait instability or visual changes. Patient reports medication compliance for blood pressure. Follows with Dr. Kenn Olszewski for GYN. Last PAP 7/2022. Last mammogram 2.5-3 years ago. Last colonoscopy 9-10 years ago. Previously followed with Jacque Ahuja as PCP.      Currently smoking 0.25 pack per day of cigarettes, previously 1 PPD for approx 20 years. Current Outpatient Medications on File Prior to Visit   Medication Sig    metoprolol succinate (TOPROL-XL) 25 mg XL tablet Take 25 mg by mouth in the morning. amLODIPine (NORVASC) 5 mg tablet Take 5 mg by mouth in the morning. aspirin 81 mg chewable tablet Take 1 Tab by mouth daily. No current facility-administered medications on file prior to visit. Medications Ordered Today   Medications    hydrocortisone (HYCORT) 1 % ointment     Sig: Apply  to affected area two (2) times a day. use thin layer     Dispense:  30 g     Refill:  0    rizatriptan (MAXALT-MLT) 10 mg disintegrating tablet     Sig: Take 1 Tablet by mouth once as needed for Migraine for up to 1 dose. Take 1 tablet by mouth daily as needed. Can take second tablet if no relief after 2 hours for max daily dose of 20mg. Dispense:  12 Tablet     Refill:  1        Review of Systems   Constitutional:  Positive for malaise/fatigue. Negative for chills, diaphoresis, fever and weight loss. HENT:  Negative for congestion, ear discharge, ear pain, hearing loss, nosebleeds and tinnitus. Eyes:  Positive for photophobia. Negative for blurred vision, double vision, pain, discharge and redness. Respiratory:  Positive for cough. Negative for sputum production and shortness of breath. Chronic early morning cough \"for years\"   Cardiovascular:  Negative for chest pain, palpitations, orthopnea, claudication, leg swelling and PND. Gastrointestinal:  Positive for nausea. Negative for abdominal pain, blood in stool, constipation, diarrhea, heartburn, melena and vomiting. Genitourinary:  Negative for dysuria, flank pain, frequency, hematuria and urgency. Musculoskeletal:  Positive for myalgias. Neurological:  Positive for dizziness and headaches. Negative for tingling, tremors, sensory change, speech change, focal weakness, seizures, loss of consciousness and weakness.    Psychiatric/Behavioral: Negative for depression, hallucinations, memory loss, substance abuse and suicidal ideas. The patient is not nervous/anxious and does not have insomnia. Visit Vitals  BP (!) 140/86 (BP 1 Location: Right arm, BP Patient Position: Sitting, BP Cuff Size: Adult)   Pulse 95   Temp 98 °F (36.7 °C) (Oral)   Resp 18   Ht 5' 6\" (1.676 m)   Wt 167 lb 9.6 oz (76 kg)   SpO2 96%   BMI 27.05 kg/m²     Physical Exam  Constitutional:       Appearance: Normal appearance. HENT:      Head: Normocephalic and atraumatic. Right Ear: Tympanic membrane, ear canal and external ear normal.      Left Ear: Tympanic membrane, ear canal and external ear normal.      Nose: Nose normal.      Mouth/Throat:      Mouth: Mucous membranes are moist.   Eyes:      Extraocular Movements: Extraocular movements intact. Conjunctiva/sclera: Conjunctivae normal.      Pupils: Pupils are equal, round, and reactive to light. Cardiovascular:      Rate and Rhythm: Normal rate and regular rhythm. Heart sounds: Normal heart sounds. No murmur heard. No friction rub. No gallop. Pulmonary:      Effort: Pulmonary effort is normal.      Breath sounds: Normal breath sounds. Abdominal:      General: Abdomen is flat. Bowel sounds are normal.      Palpations: Abdomen is soft. Musculoskeletal:         General: Normal range of motion. Skin:     General: Skin is warm and dry. Neurological:      General: No focal deficit present. Mental Status: She is alert and oriented to person, place, and time. Psychiatric:         Mood and Affect: Mood normal.         Behavior: Behavior normal.         Thought Content: Thought content normal.         Judgment: Judgment normal.        1. Breast cancer screening by mammogram  -     CT LOW DOSE LUNG CANCER SCREENING; Future  Will order mammogram now.     2. Screening for malignant neoplasm of colon  -     REFERRAL TO GASTROENTEROLOGY  Referral sent for Dr. Nakul Hogan for colorectal cancer screening via colonoscopy. 3. Primary hypertension  -     LIPID PANEL  -     TSH RFX ON ABNORMAL TO FREE T4  -     URINALYSIS W/ RFLX MICROSCOPIC  -     METABOLIC PANEL, COMPREHENSIVE  -     CBC WITH AUTOMATED DIFF  Will check fasting labs. Patient states that she will return for fasting labs one day next week. 4. Eczema, unspecified type  -     hydrocortisone (HYCORT) 1 % ointment; Apply  to affected area two (2) times a day. use thin layer, Normal, Disp-30 g, R-0  Small area noted to right forearm. Will send hydrocortisone for use up to 7 days twice daily as needed. Take as directed. Use emollient for skin hydration twice daily such as Eucerin vs Cetaphil. Avoid extreme temperatures to include showers and tub baths. 5. Episodic cluster headache, not intractable  -     rizatriptan (MAXALT-MLT) 10 mg disintegrating tablet; Take 1 Tablet by mouth once as needed for Migraine for up to 1 dose. Take 1 tablet by mouth daily as needed. Can take second tablet if no relief after 2 hours for max daily dose of 20mg., Normal, Disp-12 Tablet, R-1  Will start rizatriptan. Take as directed. Do not use for more than 9 days in month. Discussed with patient that we may need to start prophylaxis if continues to be recurrent. CT Head to rule out underlying pathology. 4 week follow up to     6. Encounter for hepatitis C screening test for low risk patient  -     HEPATITIS PANEL, ACUTE  Will order screening lab now. We discussed the expected course, resolution and complications of the diagnosis(es) in detail. Medication risks, benefits, costs, interactions, and alternatives were discussed as indicated. I advised her to contact the office if her condition worsens, changes or fails to improve as anticipated. She expressed understanding with the diagnosis(es) and plan.      Patient verbalizes understanding of plan of care as discussed above    Follow-up and Dispositions    Return in about 4 weeks (around 2/3/2023) for headache shahida

## 2023-01-06 NOTE — PROGRESS NOTES
Chief Complaint   Patient presents with    Headache     C/O headache for 3 weeks. Has had this one since Tuesday     Hypertension    Colon Cancer Screening     Wants to have screening scheduled      1. Have you been to the ER, urgent care clinic since your last visit? Hospitalized since your last visit? No    2. Have you seen or consulted any other health care providers outside of the [address] [place] [address] since your last visit? Include any pap smears or colon screening.  No    Visit Vitals  BP (!) 140/86 (BP 1 Location: Right arm, BP Patient Position: Sitting, BP Cuff Size: Adult)   Pulse 95   Temp 98 °F (36.7 °C) (Oral)   Resp 18   Ht 5' 6\" (1.676 m)   Wt 167 lb 9.6 oz (76 kg)   SpO2 96%   BMI 27.05 kg/m²

## 2023-01-16 VITALS
DIASTOLIC BLOOD PRESSURE: 85 MMHG | OXYGEN SATURATION: 96 % | BODY MASS INDEX: 26.93 KG/M2 | SYSTOLIC BLOOD PRESSURE: 137 MMHG | HEART RATE: 95 BPM | RESPIRATION RATE: 18 BRPM | WEIGHT: 167.6 LBS | TEMPERATURE: 98 F | HEIGHT: 66 IN

## 2023-01-20 ENCOUNTER — OFFICE VISIT (OUTPATIENT)
Dept: PRIMARY CARE CLINIC | Age: 58
End: 2023-01-20
Payer: COMMERCIAL

## 2023-01-20 VITALS
SYSTOLIC BLOOD PRESSURE: 120 MMHG | BODY MASS INDEX: 27 KG/M2 | DIASTOLIC BLOOD PRESSURE: 80 MMHG | OXYGEN SATURATION: 98 % | TEMPERATURE: 98 F | RESPIRATION RATE: 18 BRPM | HEART RATE: 87 BPM | WEIGHT: 168 LBS | HEIGHT: 66 IN

## 2023-01-20 DIAGNOSIS — G93.9 RIGHT FRONTAL LOBE LESION: ICD-10-CM

## 2023-01-20 DIAGNOSIS — G43.019 INTRACTABLE MIGRAINE WITHOUT AURA AND WITHOUT STATUS MIGRAINOSUS: Primary | ICD-10-CM

## 2023-01-20 RX ORDER — TOPIRAMATE 25 MG/1
25 TABLET ORAL
Qty: 30 TABLET | Refills: 1 | Status: SHIPPED | OUTPATIENT
Start: 2023-01-20

## 2023-01-20 RX ORDER — BUTALBITAL, ACETAMINOPHEN AND CAFFEINE 50; 325; 40 MG/1; MG/1; MG/1
1 TABLET ORAL
COMMUNITY
Start: 2023-01-06 | End: 2023-01-20 | Stop reason: ALTCHOICE

## 2023-01-20 RX ORDER — RIZATRIPTAN BENZOATE 10 MG/1
1 TABLET, ORALLY DISINTEGRATING ORAL AS NEEDED
COMMUNITY
Start: 2023-01-12 | End: 2023-01-23 | Stop reason: SDUPTHER

## 2023-01-20 NOTE — PROGRESS NOTES
Og Marquez is a 61 y.o. female who presents to the office today for the following:    Chief Complaint   Patient presents with    Headache     Headaches not better, c/o right eye turns blood shot red with HA        Past Medical History:   Diagnosis Date    Anxiety     Chest pain     stress Echo  normal May 2013 in [facility]    Disc herniation 06/06/2013    Noted on MRI, C3-C6, assoc with stenosis but no cord compression    HTN (hypertension)     Hypertensive urgency     Menopause     Migraine        History reviewed. No pertinent surgical history. Family History   Problem Relation Age of Onset    Cancer Mother         brain    Stroke Mother     Hypertension Mother     Breast Cancer Sister     Breast Cancer Sister     Cancer Sister     Cancer Sister         breast    Hypertension Sister     Hypertension Sister     Hypertension Father         Social History     Tobacco Use    Smoking status: Every Day     Packs/day: 0.25     Years: 20.00     Pack years: 5.00     Types: Cigarettes    Smokeless tobacco: Never   Vaping Use    Vaping Use: Never used   Substance Use Topics    Alcohol use: Never    Drug use: No        HPI  Patient here for 4-week follow-up of recurring headaches with PMH hypertension and cervical stenosis of the spine. Patient previously denied history of migraines and is stated that migraines had started 3 weeks prior to initial visit affecting left side of the head accompanied by intermittent dizziness, photophobia, phonophobia and nausea. Patient denies gait instability or visual changes. Since last visit patient was seen in the [facility] ED for headaches and endorsed some blurred vision of the right. CTA of head and additional labs were unremarkable during ER visit. In addition patient had a head CT through this provider that was unremarkable. During this visit patient received Benadryl, Toradol, and Compazine with some relief however patient endorses return of symptoms the next morning.       Today patient is stating that she may need a neurosurgeon referral.  We will discuss the need for this referral patient discloses to provider that she was previously diagnosed with a right frontal lobe lesion at [facility] in 2021. Patient states that she was seen by a neurosurgeon at that time who had planned surgery however patient did not follow-up as she forgot about the appointment. Patient continues to endorse daily migraines present upon awakening typically affecting the left side of the head accompanied by some intermittent dizziness, photophobia, phonophobia and nausea. Patient does endorse occasional right blurred vision accompanied by redness. Patient states that blurred vision and redness improve with migraine control. Patient does state that she has significant relief with use of rizatriptan prescribed during her last visit. Current Outpatient Medications on File Prior to Visit   Medication Sig    metoprolol succinate (TOPROL-XL) 25 mg XL tablet Take 25 mg by mouth in the morning. amLODIPine (NORVASC) 5 mg tablet Take 5 mg by mouth in the morning. aspirin 81 mg chewable tablet Take 1 Tab by mouth daily. hydrocortisone (HYCORT) 1 % ointment Apply  to affected area two (2) times a day. use thin layer     No current facility-administered medications on file prior to visit. Medications Ordered Today   Medications    topiramate (TOPAMAX) 25 mg tablet     Sig: Take 1 Tablet by mouth nightly. Dispense:  30 Tablet     Refill:  1    rizatriptan (MAXALT-MLT) 10 mg disintegrating tablet     Sig: Take 1 Tablet by mouth as needed for Migraine. Take 1 tab by mouth. If no improvement in 2 hours take 1 additional tab by mouth for max of 20mg daily. Dispense:  12 Tablet     Refill:  1        Review of Systems   Constitutional:  Positive for malaise/fatigue. Negative for chills and fever.    HENT:  Negative for congestion, ear discharge, ear pain, hearing loss, nosebleeds, sinus pain, sore throat and tinnitus. Phonophobia   Eyes:  Positive for double vision, photophobia and redness. Negative for blurred vision, pain and discharge. Respiratory:  Negative for cough, hemoptysis, sputum production, shortness of breath, wheezing and stridor. Cardiovascular:  Negative for chest pain, palpitations, orthopnea, claudication and leg swelling. Gastrointestinal:  Negative for abdominal pain, blood in stool, constipation, diarrhea, heartburn, melena, nausea and vomiting. Genitourinary:  Negative for dysuria, flank pain, frequency, hematuria and urgency. Musculoskeletal:  Negative for back pain, myalgias and neck pain. Neurological:  Positive for dizziness and headaches. Negative for tingling, tremors, sensory change, speech change, focal weakness, seizures, loss of consciousness and weakness. Psychiatric/Behavioral:  Negative for depression, hallucinations, memory loss, substance abuse and suicidal ideas. The patient is not nervous/anxious and does not have insomnia. Visit Vitals  /80 (BP 1 Location: Right arm, BP Patient Position: Sitting)   Pulse 87   Temp 98 °F (36.7 °C) (Oral)   Resp 18   Ht 5' 6\" (1.676 m)   Wt 168 lb (76.2 kg)   SpO2 98%   BMI 27.12 kg/m²       Physical Exam  Constitutional:       Appearance: Normal appearance. HENT:      Head: Normocephalic and atraumatic. Right Ear: Tympanic membrane, ear canal and external ear normal.      Left Ear: Tympanic membrane, ear canal and external ear normal.      Nose: Nose normal.      Mouth/Throat:      Mouth: Mucous membranes are moist.   Eyes:      Extraocular Movements: Extraocular movements intact. Conjunctiva/sclera: Conjunctivae normal.      Pupils: Pupils are equal, round, and reactive to light. Cardiovascular:      Rate and Rhythm: Normal rate and regular rhythm. Heart sounds: Normal heart sounds. No murmur heard. No friction rub. No gallop.    Pulmonary:      Effort: Pulmonary effort is normal. Breath sounds: Normal breath sounds. Abdominal:      General: Abdomen is flat. Bowel sounds are normal.      Palpations: Abdomen is soft. Musculoskeletal:         General: Normal range of motion. Skin:     General: Skin is warm and dry. Neurological:      General: No focal deficit present. Mental Status: She is alert and oriented to person, place, and time. Cranial Nerves: No cranial nerve deficit. Sensory: No sensory deficit. Motor: No weakness. Coordination: Coordination normal.      Gait: Gait normal.      Deep Tendon Reflexes: Reflexes normal.   Psychiatric:         Mood and Affect: Mood normal.         Behavior: Behavior normal.         Thought Content: Thought content normal.         Judgment: Judgment normal.        1. Intractable migraine without aura and without status migrainosus  -     REFERRAL TO NEUROLOGY  -     topiramate (TOPAMAX) 25 mg tablet; Take 1 Tablet by mouth nightly., Normal, Disp-30 Tablet, R-1  -     rizatriptan (MAXALT-MLT) 10 mg disintegrating tablet; Take 1 Tablet by mouth as needed for Migraine. Take 1 tab by mouth. If no improvement in 2 hours take 1 additional tab by mouth for max of 20mg daily. , Normal, Disp-12 Tablet, R-1  Will refill rizatriptan now. Take as directed. Will start Topamax 25 mg daily at night. We will titrate dose as needed. Recommended 4-week follow-up to determine need for Topamax titration. Seek emergency care for worsening of symptoms, unilateral weakness, slurred speech, intractable headache despite medications, unsteady gait, uncontrolled dizziness or loss of vision. Patient asked about history of prior headaches that was not disclosed and initial visit with provider. Patient states that she did have some headaches in 2021 however they have not been as bad since she was seen 12/22/2021 until 3 weeks ago.     2. Right frontal lobe lesion  -     REFERRAL TO NEUROLOGY  Provider was able to locate under care everywhere MRI from 12/22/2021 performed at [facility]. MRI with contrast on 12/22/2021 reveals a T2 hyperdense lesion within the subcortical white matter of the inferior right frontal lobe that is consistent with multinodular and vacuolating neuronal tumor. No apparent parenchymal edema or mass-effect at that time. Differentials included DNET and gangliocytoma. The lesion appeared to be well defined within the right petrous apex. Further review under Care Everywhere reveals the patient was seen by Dr. Heri Zaragoza neurosurgery at [facility] on 1/4/2022 for chronic headaches. It appears at that time Dr. Angel Murillo was trying to obtain old records from [address] [place] [address] versus [address] for prior MRI for comparison. It was felt at that time that migraines may not be related to this lesion however he was not able to confirm without prior read. It appears that they attempted to contact patient without success for prior imaging results. Per Dr. Angel Murillo if lesion was new and he may need to consider biopsy versus resection. It does not appear that patient followed up since 1/6/2022. Patient states that she forgot to follow-up on previous results or with Dr. Angel Murillo. MRI of brain without contrast from 2013 did not reveal evidence of this lesion. Will refer to neurosurgery now for further evaluation. Patient educated on importance of close follow-up and compliance with treatment and or future appointments with specialist.  Patient states understanding. Both MRI results from 2013 and 2021 included below for review by neurosurgery upon receipt of referral.    MRI Results (most recent):  Results from [place] encounter on 08/21/13    MRA BRAIN WO CONT    Narrative  **Final Report**      ICD Codes / Adm. Diagnosis: 782.0  401.9 / Disturbance of skin sensation  Unspecified essential hyperte  Examination:  MR HEAD MRA WO CON  - [id number] - Aug 21 2013  9:01PM  Accession No:  [id number]  Reason:  Rai , blurred vision      REPORT:  INDICATION:   tia, blurred vision    COMPARISON:  None    TECHNIQUE:  3-D time-of-flight MRA of the brain was performed. Multiplanar  reconstructions were obtained. FINDINGS:  The vertebral arteries are codominant. The basilar artery and its branches  are normal. The internal carotid, anterior cerebral, and middle cerebral  arteries are patent. There is no flow-limiting intracranial stenosis. There  is no aneurysm. There are no sizable posterior communicating arteries. IMPRESSION:  Normal intracranial MRA. Signing/Reading Doctor: Margoth Sainz ([id number])  Approved: Margoth Sainz ([id number])  Aug 21 2013  9:37PM    MR BRAIN W CONTRAST    Anatomical Region Laterality Modality   Head -- Magnetic Resonance     Narrative    CLINICAL DATA:  Transient ischemic attack     EXAM:   MRI HEAD WITH CONTRAST     TECHNIQUE:   Multiplanar, multiecho pulse sequences of the brain and surrounding   structures were obtained with intravenous contrast.     CONTRAST:  7mL GADAVIST GADOBUTROL 1 MMOL/ML IV SOLN     COMPARISON:  MRI of the brain December 22, 2021, 1:26 a.m. FINDINGS:   Brain: Redemonstrated T1 hypointense, T2 hyperintense lesion within   the subcortical white matter of the inferior right frontal lobe with   bubbly appearance, without susceptibility artifact or evidence of   contrast enhancement (series 7, image 25; series 8, image 84). Lesion measures approximately 1.5 x 1.5 x 0.9 cm. No associated   parenchymal edema or mass effect. Findings are most consistent with   multinodular and vacuolating neuronal tumor. Small DVA incidentally noted in the right frontal lobe. Vascular: Normal flow voids. Skull and upper cervical spine: Well-defined lesion mildly   hyperintense on T1 and hyperintense on T2 within the right petrous   apex (series 7, image 15; series 6, image 45) may represent a   cholesterol granuloma. Sinuses/Orbits: Negative.      IMPRESSION:   1. T2 hyperintense lesion within the subcortical white matter of the   inferior right frontal lobe, most consistent with multinodular and   vacuolating neuronal tumor. No associated parenchymal edema or mass   effect. Differential considerations include DNET at and   gangliocytoma. 2. Well-defined lesion within the right petrous apex, may represent   a cholesterol granuloma. Electronically Signed     By: Zonia Mckeon M.D. On: 12/22/2021 14:38  Procedure Note    de Fawad Man MD - 12/22/2021   Formatting of this note might be different from the original.   CLINICAL DATA:  Transient ischemic attack     EXAM:   MRI HEAD WITH CONTRAST     TECHNIQUE:   Multiplanar, multiecho pulse sequences of the brain and surrounding   structures were obtained with intravenous contrast.     CONTRAST:  7mL GADAVIST GADOBUTROL 1 MMOL/ML IV SOLN     COMPARISON:  MRI of the brain December 22, 2021, 1:26 a.m. FINDINGS:   Brain: Redemonstrated T1 hypointense, T2 hyperintense lesion within   the subcortical white matter of the inferior right frontal lobe with   bubbly appearance, without susceptibility artifact or evidence of   contrast enhancement (series 7, image 25; series 8, image 84). Lesion measures approximately 1.5 x 1.5 x 0.9 cm. No associated   parenchymal edema or mass effect. Findings are most consistent with   multinodular and vacuolating neuronal tumor. Small DVA incidentally noted in the right frontal lobe. Vascular: Normal flow voids. Skull and upper cervical spine: Well-defined lesion mildly   hyperintense on T1 and hyperintense on T2 within the right petrous   apex (series 7, image 15; series 6, image 45) may represent a   cholesterol granuloma. Sinuses/Orbits: Negative. IMPRESSION:   1. T2 hyperintense lesion within the subcortical white matter of the   inferior right frontal lobe, most consistent with multinodular and   vacuolating neuronal tumor.  No associated parenchymal edema or mass   effect. Differential considerations include DNET at and   gangliocytoma. 2. Well-defined lesion within the right petrous apex, may represent   a cholesterol granuloma. Electronically Signed     By: Zonia Mckeon M.D. On: 12/22/2021 14:38  Exam End: 12/22/21 14:11 Last Resulted: 12/22/21 14:38   Received From: [facility]  Result Received: 07/20/22 10:07    View Encounter           We discussed the expected course, resolution and complications of the diagnosis(es) in detail. Medication risks, benefits, costs, interactions, and alternatives were discussed as indicated. I advised her to contact the office if her condition worsens, changes or fails to improve as anticipated. She expressed understanding with the diagnosis(es) and plan. Patient verbalizes understanding of plan of care as discussed above    Follow-up and Dispositions    Return in about 3 months (around 4/20/2023), or if symptoms worsen or fail to improve.

## 2023-01-20 NOTE — PROGRESS NOTES
Chief Complaint   Patient presents with    Headache     Headaches not better, c/o right eye turns blood shot red with HA      1. Have you been to the ER, urgent care clinic since your last visit? Hospitalized since your last visit? No    2. Have you seen or consulted any other health care providers outside of the [address] [place] [address] since your last visit? Include any pap smears or colon screening.  No  Visit Vitals  /80 (BP 1 Location: Right arm, BP Patient Position: Sitting)   Pulse 87   Temp 98 °F (36.7 °C) (Oral)   Resp 18   Ht 5' 6\" (1.676 m)   Wt 168 lb (76.2 kg)   SpO2 98%   BMI 27.12 kg/m²

## 2023-01-20 NOTE — LETTER
NOTIFICATION RETURN TO WORK / SCHOOL    1/20/2023 4:14 PM    Ms. [address] [facility]      To Whom It May Concern:    Rc Gaytan is currently under the care of [facility]. She will return to work/school on: 1/23/2023. If there are questions or concerns please have the patient contact our office.         Sincerely,      JON Fortune

## 2023-01-23 ENCOUNTER — TELEPHONE (OUTPATIENT)
Dept: PRIMARY CARE CLINIC | Age: 58
End: 2023-01-23

## 2023-01-23 RX ORDER — RIZATRIPTAN BENZOATE 10 MG/1
10 TABLET, ORALLY DISINTEGRATING ORAL AS NEEDED
Qty: 12 TABLET | Refills: 1 | Status: SHIPPED | OUTPATIENT
Start: 2023-01-23

## 2023-01-29 PROBLEM — Z91.199 NONCOMPLIANCE: Status: ACTIVE | Noted: 2023-01-29

## 2023-01-29 PROBLEM — G93.9 RIGHT FRONTAL LOBE LESION: Status: ACTIVE | Noted: 2023-01-29

## 2023-01-29 PROBLEM — G43.019 INTRACTABLE MIGRAINE WITHOUT AURA AND WITHOUT STATUS MIGRAINOSUS: Status: ACTIVE | Noted: 2023-01-29

## 2023-02-20 ENCOUNTER — TELEPHONE (OUTPATIENT)
Dept: PRIMARY CARE CLINIC | Age: 58
End: 2023-02-20

## 2023-02-20 NOTE — TELEPHONE ENCOUNTER
Patient states she received referral in the  mail and contacted neurology to schedule appt. Unable to schedule as they told her that they had not received referral from our office.

## 2023-02-27 ENCOUNTER — TELEPHONE (OUTPATIENT)
Dept: PRIMARY CARE CLINIC | Age: 58
End: 2023-02-27

## 2023-02-27 NOTE — TELEPHONE ENCOUNTER
Patient called does not want to see neurologist in [facility] would like to go to [facility] neurology phone number [phone].   Please send a referral to [facility]

## 2023-02-27 NOTE — TELEPHONE ENCOUNTER
Spoke with pt and told her to call [facility] and schedule an appt and get the providers name and fax number and I will be glad to get the records sent over to that office.

## 2023-02-28 ENCOUNTER — HOSPITAL ENCOUNTER (OUTPATIENT)
Dept: CT IMAGING | Age: 58
Discharge: HOME | End: 2023-02-28
Payer: COMMERCIAL

## 2023-02-28 VITALS — WEIGHT: 167 LBS | BODY MASS INDEX: 26.84 KG/M2 | HEIGHT: 66 IN

## 2023-02-28 DIAGNOSIS — Z12.31 BREAST CANCER SCREENING BY MAMMOGRAM: ICD-10-CM

## 2023-02-28 PROCEDURE — 71271 CT THORAX LUNG CANCER SCR C-: CPT

## 2023-03-01 NOTE — PROGRESS NOTES
Patient notified that CT showed mild to moderate emphysema, no evidence of cancer or nodules. Patient to work on smoking cessations and need to see specialist. States she has talked to [facility] Dr Marce Boxer and they are setting her up with appt.

## 2023-03-01 NOTE — PROGRESS NOTES
Please let patient know that her CT showed mild to moderate emphysema. There is no evidence of lung nodules or malignancy. We will plan to repeat in 1 one. Please remind patient that I would like her to work on smoking cessation to prevent further progression of emphysema.

## 2023-03-07 ENCOUNTER — TELEPHONE (OUTPATIENT)
Dept: GASTROENTEROLOGY | Age: 58
End: 2023-03-07

## 2023-03-07 DIAGNOSIS — Z12.11 COLON CANCER SCREENING: Primary | ICD-10-CM

## 2023-03-07 NOTE — LETTER
3/7/2023 1:54 PM    Ms. [address] [place] [address]      Dear JON Jha     Thank you for referring your patient Harvey Antony , : 1965, to us for colonoscopy screening. In contacting the patient, she is scheduled for 3-.           Sincerely,      Sofy Cevallos MD

## 2023-03-07 NOTE — TELEPHONE ENCOUNTER
I spoke wit Anisa Perez, reviewed EZRA bradley, she is ok to schedule. She chose 3-. Miralax pended to Dr Dom Caal to send to [facility]. Orders and instructions mailed to patient. Orders routed to scheduling, Notification sent to La Nena Collier NP. No PA required per  [facility] REF # [id number].

## 2023-03-09 RX ORDER — POLYETHYLENE GLYCOL 3350 17 G/17G
POWDER, FOR SOLUTION ORAL
Qty: 510 G | Refills: 0 | Status: SHIPPED | OUTPATIENT
Start: 2023-03-09

## 2023-03-21 ENCOUNTER — ANESTHESIA (OUTPATIENT)
Dept: ENDOSCOPY | Age: 58
End: 2023-03-21
Payer: MEDICAID

## 2023-03-21 ENCOUNTER — HOSPITAL ENCOUNTER (OUTPATIENT)
Age: 58
Setting detail: OUTPATIENT SURGERY
Discharge: HOME | End: 2023-03-21
Attending: INTERNAL MEDICINE | Admitting: INTERNAL MEDICINE
Payer: MEDICAID

## 2023-03-21 ENCOUNTER — ANESTHESIA EVENT (OUTPATIENT)
Dept: ENDOSCOPY | Age: 58
End: 2023-03-21
Payer: MEDICAID

## 2023-03-21 VITALS
BODY MASS INDEX: 26.84 KG/M2 | HEART RATE: 82 BPM | HEIGHT: 66 IN | RESPIRATION RATE: 18 BRPM | WEIGHT: 167 LBS | OXYGEN SATURATION: 100 % | TEMPERATURE: 98.2 F | DIASTOLIC BLOOD PRESSURE: 79 MMHG | SYSTOLIC BLOOD PRESSURE: 125 MMHG

## 2023-03-21 PROCEDURE — 76040000019: Performed by: INTERNAL MEDICINE

## 2023-03-21 PROCEDURE — 74011250636 HC RX REV CODE- 250/636: Performed by: NURSE ANESTHETIST, CERTIFIED REGISTERED

## 2023-03-21 PROCEDURE — 2709999900 HC NON-CHARGEABLE SUPPLY: Performed by: INTERNAL MEDICINE

## 2023-03-21 PROCEDURE — 77030019988 HC FCPS ENDOSC DISP BSC -B: Performed by: INTERNAL MEDICINE

## 2023-03-21 PROCEDURE — 74011250636 HC RX REV CODE- 250/636: Performed by: INTERNAL MEDICINE

## 2023-03-21 PROCEDURE — 88305 TISSUE EXAM BY PATHOLOGIST: CPT

## 2023-03-21 PROCEDURE — 76060000031 HC ANESTHESIA FIRST 0.5 HR: Performed by: INTERNAL MEDICINE

## 2023-03-21 PROCEDURE — 74011000250 HC RX REV CODE- 250

## 2023-03-21 PROCEDURE — 74011250636 HC RX REV CODE- 250/636

## 2023-03-21 RX ORDER — SODIUM CHLORIDE 0.9 % (FLUSH) 0.9 %
5-40 SYRINGE (ML) INJECTION AS NEEDED
Status: DISCONTINUED | OUTPATIENT
Start: 2023-03-21 | End: 2023-03-21 | Stop reason: HOSPADM

## 2023-03-21 RX ORDER — SODIUM CHLORIDE 9 MG/ML
125 INJECTION, SOLUTION INTRAVENOUS CONTINUOUS
Status: DISCONTINUED | OUTPATIENT
Start: 2023-03-21 | End: 2023-03-21 | Stop reason: HOSPADM

## 2023-03-21 RX ORDER — SODIUM CHLORIDE 9 MG/ML
INJECTION, SOLUTION INTRAVENOUS
Status: DISCONTINUED | OUTPATIENT
Start: 2023-03-21 | End: 2023-03-21 | Stop reason: HOSPADM

## 2023-03-21 RX ORDER — PROPOFOL 10 MG/ML
INJECTION, EMULSION INTRAVENOUS AS NEEDED
Status: DISCONTINUED | OUTPATIENT
Start: 2023-03-21 | End: 2023-03-21 | Stop reason: HOSPADM

## 2023-03-21 RX ORDER — SODIUM CHLORIDE 0.9 % (FLUSH) 0.9 %
5-40 SYRINGE (ML) INJECTION EVERY 8 HOURS
Status: DISCONTINUED | OUTPATIENT
Start: 2023-03-21 | End: 2023-03-21 | Stop reason: HOSPADM

## 2023-03-21 RX ADMIN — SODIUM CHLORIDE 125 ML/HR: 9 INJECTION, SOLUTION INTRAVENOUS at 09:17

## 2023-03-21 RX ADMIN — PROPOFOL 100 MG: 10 INJECTION, EMULSION INTRAVENOUS at 10:03

## 2023-03-21 RX ADMIN — SODIUM CHLORIDE: 9 INJECTION, SOLUTION INTRAVENOUS at 09:33

## 2023-03-21 RX ADMIN — PROPOFOL 50 MG: 10 INJECTION, EMULSION INTRAVENOUS at 10:09

## 2023-03-21 RX ADMIN — PROPOFOL 100 MG: 10 INJECTION, EMULSION INTRAVENOUS at 10:12

## 2023-03-21 NOTE — ANESTHESIA PREPROCEDURE EVALUATION
Relevant Problems   No relevant active problems       Anesthetic History   No history of anesthetic complications  Other anesthesia complications          Review of Systems / Medical History  Patient summary reviewed, nursing notes reviewed and pertinent labs reviewed    Pulmonary  Within defined limits                 Neuro/Psych   Within defined limits      Psychiatric history     Cardiovascular  Within defined limits  Hypertension                   GI/Hepatic/Renal  Within defined limits              Endo/Other  Within defined limits           Other Findings              Physical Exam    Airway  Mallampati: II  TM Distance: 4 - 6 cm  Neck ROM: normal range of motion        Cardiovascular    Rhythm: regular  Rate: normal         Dental  No notable dental hx       Pulmonary  Breath sounds clear to auscultation               Abdominal  Abdominal exam normal       Other Findings            Anesthetic Plan    ASA: 2  Anesthesia type: total IV anesthesia            Anesthetic plan and risks discussed with: Patient

## 2023-03-21 NOTE — ANESTHESIA POSTPROCEDURE EVALUATION
Procedure(s):  COLONOSCOPY.    total IV anesthesia    Anesthesia Post Evaluation      Multimodal analgesia: multimodal analgesia not used between 6 hours prior to anesthesia start to PACU discharge  Patient location during evaluation: PACU  Patient participation: complete - patient participated  Level of consciousness: sleepy but conscious  Pain management: adequate  Airway patency: patent  Anesthetic complications: no  Cardiovascular status: acceptable and stable  Respiratory status: acceptable and room air  Hydration status: acceptable  Post anesthesia nausea and vomiting:  none  Final Post Anesthesia Temperature Assessment:  Normothermia (36.0-37.5 degrees C)      INITIAL Post-op Vital signs: No vitals data found for the desired time range.

## 2023-03-21 NOTE — OP NOTES
Colonoscopy Procedure Note      Patient: Richar Short MRN: [id number]  SSN: [id number]    YOB: 1965  Age: 61 y.o. Sex: female      Date of Procedure: 3/21/2023  Date/Time:  3/21/2023 10:26 AM       IMPRESSION:     1. Rectal polyps   2. Sigmoid diverticulosis         RECOMMENDATIONS:     1) Check biopsy results. 2) Await pathology report. Call me in 2 weeks if you have not received any information from my office regarding your results. 3) Repeat colonoscopy in 2 to 3 years or as determined by the pathology report. INDICATION: Colon screening    PROCEDURE PERFORMED: Colonoscopy with cold biopsies     DESCRIPTION OF PROCEDURE: An informed consent was obtained. The patient was placed in left lateral position. Perianal inspection and a digital rectal exam was performed. Video colonoscope was introduced into the rectum and advanced under direct vision up to the terminal ileum. With adequate insufflation and maneuvering of the withdrawing scope, the colonic mucosa was visualized carefully. Retroflexion was performed in the rectum to see the anorectum and also in the ascending colon to look behind the folds. Vital signs, pulse oximetry, single lead cardiac monitor were monitored throughout the procedure as the sedation was titrated to the desired effect ensuring patient comfort and safety. The patient tolerated the procedure very well and was transferred to the recovery area. Following is the summary of findings: Couple small polyps measuring 0.3 and 0.4 cm was removed via cold biopsies. They were located in the rectum. Patient had a few small diverticula noted throughout the sigmoid colon.         ENDOSCOPIST: Elpidio Foote MD     ENDOSCOPE: Olympus video colonoscope     ASSISTANT:Circ-1: Tammy Soler RN             Scrub Tech-1: Gilma Freeman     ANESTHESIA: TIVA      QUALITY OF PREPARATION:good      FINDINGS:   Rectal polyps  Sigmoid diverticulosis        Complication: None      EBL: Minimal     SPECIMENS:   ID Type Source Tests Collected by Time Destination   1 : rectal polyps Preservative   Rafita Frank MD 3/21/2023 1020 Pathology             Xena Holman MD  March 21, 2023  10:26 AM

## 2023-03-21 NOTE — DISCHARGE INSTRUCTIONS
HPI      ROS      For the next 12 hours you should not:   1. drive   2. drink alcohol   3. operate any machinery   4. engage in activities that require mental sharpness or manual dexterity such as     cooking   5. take any drugs other than those prescribed by a physician   6. make any legal or financial decisions    Call your doctor's office immediately, if there is is anything unusual:   1. increased and continuing rectal bleeding   2. fever   3. Unusual abdominal pain    Take it easy today and resume normal activity tomorrow. It is common to have gas and mild bloating for a few hours. Pain is NOT normal. You may be groggy off and on for a few hours. Resume previous diet. Ken Spann MD  3/21/2023  10:35 AM                           For the next 12 hours you should not:   1. drive   2. drink alcohol   3. operate any machinery   4. engage in activities that require mental sharpness or manual dexterity such as     cooking   5. take any drugs other than those prescribed by a physician   6. make any legal or financial decisions    Call your doctor's office immediately, if there is is anything unusual:   1. increased and continuing rectal bleeding   2. fever   3. Unusual abdominal pain    Take it easy today and resume normal activity tomorrow. It is common to have gas and mild bloating for a few hours. Pain is NOT normal. You may be groggy off and on for a few hours. Resume previous diet.         Ken Spann MD  3/21/2023  10:35 AM                               Physical Exam

## 2023-03-21 NOTE — H&P
History and Physical    Birtha Amber        1965  [id number]        [id number]     Pre-Procedure Diagnosis:  Encounter for screening colonoscopy [Z12.11]    Chief Complaint:  No chief complaint on file. HPI: 74-year-old female with history of hypertension, anxiety disorder, migraine headaches, and mental illness who comes in for screening colonoscopy. Patient has no new complaints today. Past Medical History:   Diagnosis Date    Anxiety     Chest pain     stress Echo  normal May 2013 in [facility]    Disc herniation 06/06/2013    Noted on MRI, C3-C6, assoc with stenosis but no cord compression    HTN (hypertension)     Hypertensive urgency     Menopause     Migraine     Psychiatric disorder      Past Surgical History:   Procedure Laterality Date    HX COLONOSCOPY       Family History   Problem Relation Age of Onset    Cancer Mother         brain    Stroke Mother     Hypertension Mother     Stroke Father     Hypertension Father     Breast Cancer Sister     Breast Cancer Sister     Hypertension Sister     Cancer Sister         breast    Hypertension Sister     Hypertension Sister      Social History     Socioeconomic History    Marital status:     Highest education level: 12th grade   Occupational History    Occupation:    Tobacco Use    Smoking status: Every Day     Packs/day: 0.25     Years: 20.00     Pack years: 5.00     Types: Cigarettes    Smokeless tobacco: Never   Vaping Use    Vaping Use: Never used   Substance and Sexual Activity    Alcohol use: Never    Drug use: No     Comment: years ago    Sexual activity: Yes     Partners: Male     Comment: denies h/o STIs or abnml pap smears     Social Determinants of Health     Physical Activity: Sufficiently Active    Days of Exercise per Week: 4 days    Minutes of Exercise per Session: 60 min       Allergies:     Allergies   Allergen Reactions    Tetracycline Hives     Medications:   Current Facility-Administered Medications   Medication Dose Route Frequency    0.9% sodium chloride infusion  125 mL/hr IntraVENous CONTINUOUS     Facility-Administered Medications Ordered in Other Encounters   Medication Dose Route Frequency    0.9% sodium chloride infusion   IntraVENous CONTINUOUS     Vital Signs Visit Vitals  /77   Pulse 85   Temp 97.4 °F (36.3 °C)   Resp 18   Ht 5' 5.5\" (1.664 m)   Wt 75.8 kg (167 lb)   SpO2 100%   Breastfeeding No   BMI 27.37 kg/m²       Review of Systems  Review of systems as noted in the HPI. Physical Exam:  General:  Alert, cooperative, no distress, appears stated age. Eyes:  No icterus   Neck: Supple, no adenopathy and no JVD. Lungs:   Clear to auscultation bilaterally. Heart:  Regular rate and rhythm, S1, S2 normal, no murmur, click, rub or gallop. Abdomen:   Soft, non-tender. Bowel sounds normal. No masses,  No organomegaly. Neurologic: Grossly intact. Laboratory Data:  No results found for this or any previous visit (from the past 24 hour(s)).     Hospital Problems  Date Reviewed: 1/20/2023   None       Impression and Plan:  Colon screening  -Colonoscopy      Elpidio Foote MD  3/21/2023  9:51 AM

## 2023-10-26 ENCOUNTER — OFFICE VISIT (OUTPATIENT)
Facility: CLINIC | Age: 58
End: 2023-10-26
Payer: MEDICAID

## 2023-10-26 VITALS
HEART RATE: 88 BPM | HEIGHT: 65 IN | SYSTOLIC BLOOD PRESSURE: 138 MMHG | DIASTOLIC BLOOD PRESSURE: 82 MMHG | BODY MASS INDEX: 26.59 KG/M2 | RESPIRATION RATE: 18 BRPM | WEIGHT: 159.6 LBS | OXYGEN SATURATION: 100 % | TEMPERATURE: 98 F

## 2023-10-26 DIAGNOSIS — K21.9 GASTROESOPHAGEAL REFLUX DISEASE, UNSPECIFIED WHETHER ESOPHAGITIS PRESENT: Primary | ICD-10-CM

## 2023-10-26 DIAGNOSIS — I10 ESSENTIAL (PRIMARY) HYPERTENSION: ICD-10-CM

## 2023-10-26 DIAGNOSIS — Z12.31 BREAST CANCER SCREENING BY MAMMOGRAM: ICD-10-CM

## 2023-10-26 DIAGNOSIS — M25.562 ACUTE PAIN OF LEFT KNEE: ICD-10-CM

## 2023-10-26 DIAGNOSIS — Z13.1 DIABETES MELLITUS SCREENING: ICD-10-CM

## 2023-10-26 PROBLEM — N76.0 BACTERIAL VAGINOSIS: Status: RESOLVED | Noted: 2022-07-29 | Resolved: 2023-10-26

## 2023-10-26 PROBLEM — B96.89 BACTERIAL VAGINOSIS: Status: RESOLVED | Noted: 2022-07-29 | Resolved: 2023-10-26

## 2023-10-26 PROCEDURE — 3074F SYST BP LT 130 MM HG: CPT

## 2023-10-26 PROCEDURE — 3078F DIAST BP <80 MM HG: CPT

## 2023-10-26 PROCEDURE — 99214 OFFICE O/P EST MOD 30 MIN: CPT

## 2023-10-26 RX ORDER — METOPROLOL SUCCINATE 25 MG/1
25 TABLET, EXTENDED RELEASE ORAL DAILY
Qty: 90 TABLET | Refills: 1 | Status: SHIPPED | OUTPATIENT
Start: 2023-10-26

## 2023-10-26 RX ORDER — AMLODIPINE BESYLATE 5 MG/1
5 TABLET ORAL DAILY
Qty: 90 TABLET | Refills: 1 | Status: SHIPPED | OUTPATIENT
Start: 2023-10-26

## 2023-10-26 RX ORDER — PREDNISONE 20 MG/1
TABLET ORAL
Qty: 9 TABLET | Refills: 0 | Status: SHIPPED | OUTPATIENT
Start: 2023-10-26

## 2023-10-26 RX ORDER — PANTOPRAZOLE SODIUM 40 MG/1
40 TABLET, DELAYED RELEASE ORAL DAILY
COMMUNITY
Start: 2021-12-23 | End: 2023-10-26 | Stop reason: SDUPTHER

## 2023-10-26 RX ORDER — PANTOPRAZOLE SODIUM 40 MG/1
40 TABLET, DELAYED RELEASE ORAL DAILY
Qty: 90 TABLET | Refills: 1 | Status: SHIPPED | OUTPATIENT
Start: 2023-10-26

## 2023-10-26 SDOH — ECONOMIC STABILITY: INCOME INSECURITY: HOW HARD IS IT FOR YOU TO PAY FOR THE VERY BASICS LIKE FOOD, HOUSING, MEDICAL CARE, AND HEATING?: PATIENT DECLINED

## 2023-10-26 SDOH — ECONOMIC STABILITY: FOOD INSECURITY: WITHIN THE PAST 12 MONTHS, YOU WORRIED THAT YOUR FOOD WOULD RUN OUT BEFORE YOU GOT MONEY TO BUY MORE.: PATIENT DECLINED

## 2023-10-26 SDOH — ECONOMIC STABILITY: HOUSING INSECURITY
IN THE LAST 12 MONTHS, WAS THERE A TIME WHEN YOU DID NOT HAVE A STEADY PLACE TO SLEEP OR SLEPT IN A SHELTER (INCLUDING NOW)?: PATIENT REFUSED

## 2023-10-26 ASSESSMENT — PATIENT HEALTH QUESTIONNAIRE - PHQ9
SUM OF ALL RESPONSES TO PHQ QUESTIONS 1-9: 0
SUM OF ALL RESPONSES TO PHQ QUESTIONS 1-9: 0
2. FEELING DOWN, DEPRESSED OR HOPELESS: 0
SUM OF ALL RESPONSES TO PHQ QUESTIONS 1-9: 0
1. LITTLE INTEREST OR PLEASURE IN DOING THINGS: 0
SUM OF ALL RESPONSES TO PHQ QUESTIONS 1-9: 0
SUM OF ALL RESPONSES TO PHQ9 QUESTIONS 1 & 2: 0

## 2023-10-27 ASSESSMENT — ENCOUNTER SYMPTOMS
EYES NEGATIVE: 1
RESPIRATORY NEGATIVE: 1
GASTROINTESTINAL NEGATIVE: 1

## 2023-11-15 ENCOUNTER — HOSPITAL ENCOUNTER (OUTPATIENT)
Facility: HOSPITAL | Age: 58
Discharge: HOME | End: 2023-11-18
Payer: COMMERCIAL

## 2023-11-15 ENCOUNTER — OFFICE VISIT (OUTPATIENT)
Facility: CLINIC | Age: 58
End: 2023-11-15
Payer: COMMERCIAL

## 2023-11-15 VITALS
TEMPERATURE: 98.2 F | HEIGHT: 65 IN | RESPIRATION RATE: 18 BRPM | WEIGHT: 162.4 LBS | OXYGEN SATURATION: 98 % | HEART RATE: 72 BPM | BODY MASS INDEX: 27.06 KG/M2 | DIASTOLIC BLOOD PRESSURE: 78 MMHG | SYSTOLIC BLOOD PRESSURE: 138 MMHG

## 2023-11-15 VITALS — WEIGHT: 162 LBS | HEIGHT: 65 IN | BODY MASS INDEX: 26.99 KG/M2

## 2023-11-15 DIAGNOSIS — Z12.39 ENCOUNTER FOR SCREENING BREAST EXAMINATION: ICD-10-CM

## 2023-11-15 DIAGNOSIS — Z12.4 ENCOUNTER FOR PAPANICOLAOU SMEAR FOR CERVICAL CANCER SCREENING: Primary | ICD-10-CM

## 2023-11-15 DIAGNOSIS — Z12.31 BREAST CANCER SCREENING BY MAMMOGRAM: ICD-10-CM

## 2023-11-15 DIAGNOSIS — Z01.419 ENCOUNTER FOR ANNUAL ROUTINE GYNECOLOGICAL EXAMINATION: ICD-10-CM

## 2023-11-15 DIAGNOSIS — M25.562 ACUTE PAIN OF LEFT KNEE: ICD-10-CM

## 2023-11-15 PROBLEM — K21.9 GASTROESOPHAGEAL REFLUX DISEASE: Status: ACTIVE | Noted: 2023-11-15

## 2023-11-15 PROBLEM — F17.200 TOBACCO DEPENDENCE: Status: ACTIVE | Noted: 2023-11-15

## 2023-11-15 PROCEDURE — 3078F DIAST BP <80 MM HG: CPT

## 2023-11-15 PROCEDURE — 99396 PREV VISIT EST AGE 40-64: CPT

## 2023-11-15 PROCEDURE — 3075F SYST BP GE 130 - 139MM HG: CPT

## 2023-11-15 PROCEDURE — 77063 BREAST TOMOSYNTHESIS BI: CPT

## 2023-11-15 ASSESSMENT — ENCOUNTER SYMPTOMS
RESPIRATORY NEGATIVE: 1
GASTROINTESTINAL NEGATIVE: 1

## 2023-11-15 ASSESSMENT — PATIENT HEALTH QUESTIONNAIRE - PHQ9
SUM OF ALL RESPONSES TO PHQ QUESTIONS 1-9: 0
SUM OF ALL RESPONSES TO PHQ QUESTIONS 1-9: 0
1. LITTLE INTEREST OR PLEASURE IN DOING THINGS: 0
2. FEELING DOWN, DEPRESSED OR HOPELESS: 0
SUM OF ALL RESPONSES TO PHQ9 QUESTIONS 1 & 2: 0
SUM OF ALL RESPONSES TO PHQ QUESTIONS 1-9: 0
SUM OF ALL RESPONSES TO PHQ QUESTIONS 1-9: 0

## 2023-11-16 LAB
ALBUMIN SERPL-MCNC: 4.6 G/DL (ref 3.8–4.9)
ALBUMIN/GLOB SERPL: 1.6 {RATIO} (ref 1.2–2.2)
ALP SERPL-CCNC: 88 IU/L (ref 44–121)
ALT SERPL-CCNC: 18 IU/L (ref 0–32)
APPEARANCE UR: CLEAR
AST SERPL-CCNC: 18 IU/L (ref 0–40)
BACTERIA #/AREA URNS HPF: ABNORMAL /[HPF]
BASOPHILS # BLD AUTO: 0 X10E3/UL (ref 0–0.2)
BASOPHILS NFR BLD AUTO: 1 %
BILIRUB SERPL-MCNC: 0.4 MG/DL (ref 0–1.2)
BILIRUB UR QL STRIP: NEGATIVE
BUN SERPL-MCNC: 8 MG/DL (ref 6–24)
BUN/CREAT SERPL: 9 (ref 9–23)
CALCIUM SERPL-MCNC: 10 MG/DL (ref 8.7–10.2)
CASTS URNS QL MICRO: ABNORMAL /LPF
CHLORIDE SERPL-SCNC: 103 MMOL/L (ref 96–106)
CHOLEST SERPL-MCNC: 188 MG/DL (ref 100–199)
CO2 SERPL-SCNC: 27 MMOL/L (ref 20–29)
COLOR UR: YELLOW
CREAT SERPL-MCNC: 0.89 MG/DL (ref 0.57–1)
EGFRCR SERPLBLD CKD-EPI 2021: 75 ML/MIN/1.73
EOSINOPHIL # BLD AUTO: 0.1 X10E3/UL (ref 0–0.4)
EOSINOPHIL NFR BLD AUTO: 2 %
EPI CELLS #/AREA URNS HPF: ABNORMAL /HPF (ref 0–10)
ERYTHROCYTE [DISTWIDTH] IN BLOOD BY AUTOMATED COUNT: 13.2 % (ref 11.7–15.4)
GLOBULIN SER CALC-MCNC: 2.8 G/DL (ref 1.5–4.5)
GLUCOSE SERPL-MCNC: 91 MG/DL (ref 70–99)
GLUCOSE UR QL STRIP: NEGATIVE
HBA1C MFR BLD: 5.8 % (ref 4.8–5.6)
HCT VFR BLD AUTO: 46.9 % (ref 34–46.6)
HDLC SERPL-MCNC: 64 MG/DL
HGB BLD-MCNC: 15.6 G/DL (ref 11.1–15.9)
HGB UR QL STRIP: ABNORMAL
IMM GRANULOCYTES # BLD AUTO: 0 X10E3/UL (ref 0–0.1)
IMM GRANULOCYTES NFR BLD AUTO: 0 %
KETONES UR QL STRIP: NEGATIVE
LDLC SERPL CALC-MCNC: 110 MG/DL (ref 0–99)
LEUKOCYTE ESTERASE UR QL STRIP: NEGATIVE
LYMPHOCYTES # BLD AUTO: 1.8 X10E3/UL (ref 0.7–3.1)
LYMPHOCYTES NFR BLD AUTO: 37 %
MCH RBC QN AUTO: 31 PG (ref 26.6–33)
MCHC RBC AUTO-ENTMCNC: 33.3 G/DL (ref 31.5–35.7)
MCV RBC AUTO: 93 FL (ref 79–97)
MICRO URNS: ABNORMAL
MONOCYTES # BLD AUTO: 0.4 X10E3/UL (ref 0.1–0.9)
MONOCYTES NFR BLD AUTO: 8 %
NEUTROPHILS # BLD AUTO: 2.5 X10E3/UL (ref 1.4–7)
NEUTROPHILS NFR BLD AUTO: 52 %
NITRITE UR QL STRIP: NEGATIVE
PH UR STRIP: 5.5 [PH] (ref 5–7.5)
PLATELET # BLD AUTO: 201 X10E3/UL (ref 150–450)
POTASSIUM SERPL-SCNC: 4.9 MMOL/L (ref 3.5–5.2)
PROT SERPL-MCNC: 7.4 G/DL (ref 6–8.5)
PROT UR QL STRIP: NEGATIVE
RBC # BLD AUTO: 5.04 X10E6/UL (ref 3.77–5.28)
RBC #/AREA URNS HPF: ABNORMAL /HPF (ref 0–2)
SODIUM SERPL-SCNC: 141 MMOL/L (ref 134–144)
SP GR UR STRIP: 1.02 (ref 1–1.03)
T4 FREE SERPL-MCNC: 1.61 NG/DL (ref 0.82–1.77)
TRIGL SERPL-MCNC: 75 MG/DL (ref 0–149)
TSH SERPL DL<=0.005 MIU/L-ACNC: 0.61 UIU/ML (ref 0.45–4.5)
UROBILINOGEN UR STRIP-MCNC: 0.2 MG/DL (ref 0.2–1)
VLDLC SERPL CALC-MCNC: 14 MG/DL (ref 5–40)
WBC # BLD AUTO: 4.8 X10E3/UL (ref 3.4–10.8)
WBC #/AREA URNS HPF: ABNORMAL /HPF (ref 0–5)

## 2023-11-20 LAB
C TRACH RRNA CVX QL NAA+PROBE: NEGATIVE
CYTOLOGIST CVX/VAG CYTO: NORMAL
CYTOLOGY CVX/VAG DOC CYTO: NORMAL
CYTOLOGY CVX/VAG DOC THIN PREP: NORMAL
DX ICD CODE: NORMAL
HPV GENOTYPE REFLEX: NORMAL
HPV I/H RISK 4 DNA CVX QL PROBE+SIG AMP: NEGATIVE
Lab: NORMAL
N GONORRHOEA RRNA CVX QL NAA+PROBE: NEGATIVE
OTHER STN SPEC: NORMAL
STAT OF ADQ CVX/VAG CYTO-IMP: NORMAL
T VAGINALIS RRNA SPEC QL NAA+PROBE: NEGATIVE

## 2023-12-23 ENCOUNTER — HOSPITAL ENCOUNTER (EMERGENCY)
Facility: HOSPITAL | Age: 58
Discharge: HOME | End: 2023-12-23
Attending: EMERGENCY MEDICINE
Payer: COMMERCIAL

## 2023-12-23 VITALS
SYSTOLIC BLOOD PRESSURE: 131 MMHG | BODY MASS INDEX: 25.38 KG/M2 | RESPIRATION RATE: 16 BRPM | HEART RATE: 84 BPM | HEIGHT: 66 IN | DIASTOLIC BLOOD PRESSURE: 82 MMHG | WEIGHT: 157.9 LBS | OXYGEN SATURATION: 98 % | TEMPERATURE: 99.2 F

## 2023-12-23 DIAGNOSIS — R07.89 ATYPICAL CHEST PAIN: Primary | ICD-10-CM

## 2023-12-23 LAB
ALBUMIN SERPL-MCNC: 4 G/DL (ref 3.5–5)
ALBUMIN/GLOB SERPL: 1.1 (ref 1.1–2.2)
ALP SERPL-CCNC: 85 U/L (ref 45–117)
ALT SERPL-CCNC: 19 U/L (ref 12–78)
ANION GAP SERPL CALC-SCNC: 8 MMOL/L (ref 5–15)
AST SERPL W P-5'-P-CCNC: 21 U/L (ref 15–37)
BASOPHILS # BLD: 0 K/UL (ref 0–0.1)
BASOPHILS NFR BLD: 1 % (ref 0–1)
BILIRUB SERPL-MCNC: 0.6 MG/DL (ref 0.2–1)
BUN SERPL-MCNC: 7 MG/DL (ref 6–20)
BUN/CREAT SERPL: 7 (ref 12–20)
CA-I BLD-MCNC: 9.4 MG/DL (ref 8.5–10.1)
CHLORIDE SERPL-SCNC: 104 MMOL/L (ref 97–108)
CO2 SERPL-SCNC: 27 MMOL/L (ref 21–32)
CREAT SERPL-MCNC: 1.01 MG/DL (ref 0.55–1.02)
DIFFERENTIAL METHOD BLD: NORMAL
EOSINOPHIL # BLD: 0 K/UL (ref 0–0.4)
EOSINOPHIL NFR BLD: 0 % (ref 0–7)
ERYTHROCYTE [DISTWIDTH] IN BLOOD BY AUTOMATED COUNT: 13.1 % (ref 11.5–14.5)
GLOBULIN SER CALC-MCNC: 3.6 G/DL (ref 2–4)
GLUCOSE SERPL-MCNC: 115 MG/DL (ref 65–100)
HCT VFR BLD AUTO: 42 % (ref 35–47)
HGB BLD-MCNC: 14.4 G/DL (ref 11.5–16)
IMM GRANULOCYTES # BLD AUTO: 0 K/UL (ref 0–0.04)
IMM GRANULOCYTES NFR BLD AUTO: 0 % (ref 0–0.5)
LYMPHOCYTES # BLD: 1.4 K/UL (ref 0.8–3.5)
LYMPHOCYTES NFR BLD: 25 % (ref 12–49)
MCH RBC QN AUTO: 30.8 PG (ref 26–34)
MCHC RBC AUTO-ENTMCNC: 34.3 G/DL (ref 30–36.5)
MCV RBC AUTO: 89.9 FL (ref 80–99)
MONOCYTES # BLD: 0.4 K/UL (ref 0–1)
MONOCYTES NFR BLD: 7 % (ref 5–13)
NEUTS SEG # BLD: 3.7 K/UL (ref 1.8–8)
NEUTS SEG NFR BLD: 67 % (ref 32–75)
NRBC # BLD: 0 K/UL (ref 0–0.01)
NRBC BLD-RTO: 0 PER 100 WBC
PLATELET # BLD AUTO: 164 K/UL (ref 150–400)
PMV BLD AUTO: 9.8 FL (ref 8.9–12.9)
POTASSIUM SERPL-SCNC: 3.3 MMOL/L (ref 3.5–5.1)
PROT SERPL-MCNC: 7.6 G/DL (ref 6.4–8.2)
RBC # BLD AUTO: 4.67 M/UL (ref 3.8–5.2)
SODIUM SERPL-SCNC: 139 MMOL/L (ref 136–145)
TROPONIN I SERPL HS-MCNC: 10 NG/L (ref 0–51)
TROPONIN I SERPL HS-MCNC: 11 NG/L (ref 0–51)
WBC # BLD AUTO: 5.6 K/UL (ref 3.6–11)

## 2023-12-23 PROCEDURE — 6360000002 HC RX W HCPCS: Performed by: EMERGENCY MEDICINE

## 2023-12-23 PROCEDURE — 80053 COMPREHEN METABOLIC PANEL: CPT

## 2023-12-23 PROCEDURE — 36415 COLL VENOUS BLD VENIPUNCTURE: CPT

## 2023-12-23 PROCEDURE — 93005 ELECTROCARDIOGRAM TRACING: CPT | Performed by: EMERGENCY MEDICINE

## 2023-12-23 PROCEDURE — 85025 COMPLETE CBC W/AUTO DIFF WBC: CPT

## 2023-12-23 PROCEDURE — 84484 ASSAY OF TROPONIN QUANT: CPT

## 2023-12-23 PROCEDURE — 96374 THER/PROPH/DIAG INJ IV PUSH: CPT

## 2023-12-23 PROCEDURE — 6370000000 HC RX 637 (ALT 250 FOR IP): Performed by: EMERGENCY MEDICINE

## 2023-12-23 PROCEDURE — 99284 EMERGENCY DEPT VISIT MOD MDM: CPT

## 2023-12-23 PROCEDURE — 94760 N-INVAS EAR/PLS OXIMETRY 1: CPT

## 2023-12-23 RX ORDER — METHOCARBAMOL 750 MG/1
750 TABLET, FILM COATED ORAL
Status: COMPLETED | OUTPATIENT
Start: 2023-12-23 | End: 2023-12-23

## 2023-12-23 RX ORDER — KETOROLAC TROMETHAMINE 30 MG/ML
30 INJECTION, SOLUTION INTRAMUSCULAR; INTRAVENOUS
Status: COMPLETED | OUTPATIENT
Start: 2023-12-23 | End: 2023-12-23

## 2023-12-23 RX ADMIN — KETOROLAC TROMETHAMINE 30 MG: 30 INJECTION, SOLUTION INTRAMUSCULAR at 20:17

## 2023-12-23 RX ADMIN — METHOCARBAMOL TABLETS 750 MG: 750 TABLET, COATED ORAL at 20:17

## 2023-12-24 NOTE — ED TRIAGE NOTES
Patient presents by EMS from an urgent care facility. Patient c/o chest pain that began yesterday and persisted into today. Patient states that it radiates down the left shoulder and arm. 324mg of ASA and 4mg of zofran given by EMS en route.

## 2023-12-24 NOTE — ED PROVIDER NOTES
instructions have been discussed, understanding conveyed, and agreed upon. The patient is to follow up as recommended or return to ER should their symptoms worsen. PATIENT REFERRED TO:  No follow-up provider specified. DISCHARGE MEDICATIONS:     Medication List        ASK your doctor about these medications      amLODIPine 5 MG tablet  Commonly known as: NORVASC  Take 1 tablet by mouth daily     aspirin 81 MG chewable tablet     atorvastatin 10 MG tablet  Commonly known as: LIPITOR  Take 1 tablet by mouth daily     diclofenac sodium 1 % Gel  Commonly known as: VOLTAREN  Apply 4 g topically 4 times daily     hydrocortisone 1 % ointment     metoprolol succinate 25 MG extended release tablet  Commonly known as: TOPROL XL  Take 1 tablet by mouth daily     pantoprazole 40 MG tablet  Commonly known as: PROTONIX  Take 1 tablet by mouth daily                DISCONTINUED MEDICATIONS:  Current Discharge Medication List          I am the Primary Clinician of Record: Stephanie Riley MD (electronically signed)    (Please note that parts of this dictation were completed with voice recognition software. Quite often unanticipated grammatical, syntax, homophones, and other interpretive errors are inadvertently transcribed by the computer software. Please disregards these errors.  Please excuse any errors that have escaped final proofreading.)      Stephanie Riley MD  12/26/23 1007

## 2023-12-24 NOTE — ED NOTES
Patient stable at time of discharge. Education and discharge paperwork is reviewed with patient who verbalizes understanding of same. Patient ambulates from ED with all belongings.

## 2023-12-26 ENCOUNTER — TELEPHONE (OUTPATIENT)
Facility: CLINIC | Age: 58
End: 2023-12-26

## 2023-12-26 LAB
EKG ATRIAL RATE: 67 BPM
EKG DIAGNOSIS: NORMAL
EKG P AXIS: 86 DEGREES
EKG P-R INTERVAL: 155 MS
EKG Q-T INTERVAL: 415 MS
EKG QRS DURATION: 79 MS
EKG QTC CALCULATION (BAZETT): 438 MS
EKG R AXIS: 76 DEGREES
EKG T AXIS: 64 DEGREES
EKG VENTRICULAR RATE: 67 BPM

## 2023-12-26 NOTE — TELEPHONE ENCOUNTER
Pt is requesting a medication for severe anxiety - took it before but is unsure of the name of the medication-[facility] on [id number]

## 2023-12-27 NOTE — TELEPHONE ENCOUNTER
Patient states she haven't taken these meds since 2018. But recently had to go to ER for chest pain. And she thinks its anxiety. And she may need them refilled Amtriptyline 50mg 2 bedtime and trazadone 50mg 2 bedtime. States he don't think she needs both of tthem.

## 2023-12-28 ENCOUNTER — TELEMEDICINE (OUTPATIENT)
Facility: CLINIC | Age: 58
End: 2023-12-28
Payer: COMMERCIAL

## 2023-12-28 ENCOUNTER — TELEPHONE (OUTPATIENT)
Facility: CLINIC | Age: 58
End: 2023-12-28

## 2023-12-28 DIAGNOSIS — G47.00 INSOMNIA, UNSPECIFIED TYPE: Primary | ICD-10-CM

## 2023-12-28 DIAGNOSIS — F32.A ANXIETY AND DEPRESSION: ICD-10-CM

## 2023-12-28 DIAGNOSIS — F41.9 ANXIETY AND DEPRESSION: ICD-10-CM

## 2023-12-28 PROCEDURE — 99213 OFFICE O/P EST LOW 20 MIN: CPT

## 2023-12-28 RX ORDER — HYDROXYZINE HYDROCHLORIDE 25 MG/1
25 TABLET, FILM COATED ORAL EVERY 8 HOURS PRN
Qty: 90 TABLET | Refills: 0 | Status: SHIPPED | OUTPATIENT
Start: 2023-12-28

## 2023-12-28 RX ORDER — TRAZODONE HYDROCHLORIDE 50 MG/1
TABLET ORAL
Qty: 57 TABLET | Refills: 0 | Status: SHIPPED | OUTPATIENT
Start: 2023-12-28

## 2023-12-28 NOTE — PROGRESS NOTES
Lizbeth Parisi, was evaluated through a synchronous (real-time) audio-video encounter. The patient (or guardian if applicable) is aware that this is a billable service, which includes applicable co-pays. This Virtual Visit was conducted with patient's (and/or legal guardian's) consent. Patient identification was verified, and a caregiver was present when appropriate. The patient was located at Home: [address] [place] [address]  Provider was located at [facility] ([address] [place] [address]): [address] [place] [address] Candida (:  1965) is a Established patient, presenting virtually for evaluation of the following:    Assessment & Plan   Below is the assessment and plan developed based on review of pertinent history, physical exam, labs, studies, and medications. 1. Insomnia, unspecified type  -     traZODone (DESYREL) 50 MG tablet; Take 1 tab by mouth at bedtime for 7 days then take 2 tabs by mouth at bedtime, Disp-57 tablet, R-0Normal  Will start Trazodone now for both sleep/depression. Take as directed. Do not take and drive or operate machinery as it can cause drowsiness. 2. Anxiety and depression  -     traZODone (DESYREL) 50 MG tablet; Take 1 tab by mouth at bedtime for 7 days then take 2 tabs by mouth at bedtime, Disp-57 tablet, R-0Normal  -     hydrOXYzine HCl (ATARAX) 25 MG tablet; Take 1 tablet by mouth every 8 hours as needed for Anxiety, Disp-90 tablet, R-0Normal  This is not well controlled. PHQ9 score of 17 and GAD7 score of 19. Patient requesting to go back on Trazodone as that in conjunction with Amitriptyline worked well in past. She wants to start 1 medication at a time which is reasonable. Take as directed. Can take up to 6 weeks for effectiveness. Educated patient that this medication can cause SI HI and AVH. If SI HI or AVH contact provider immediately or seek ER care. Vistaril prn anxiety attack. Take as directed.   Discussed adjuvant therapy to include

## 2024-02-05 ENCOUNTER — TELEPHONE (OUTPATIENT)
Facility: CLINIC | Age: 59
End: 2024-02-05

## 2024-02-05 NOTE — TELEPHONE ENCOUNTER
----- Message from Prince Elder sent at 2/5/2024 12:25 PM EST -----  Subject: Referral Request    Reason for referral request? Patient would like office to put in order for   a stress test. Please call back once the order is in.  Provider patient wants to be referred to(if known):     Provider Phone Number(if known):    Additional Information for Provider?   ---------------------------------------------------------------------------  --------------  CALL BACK INFO    [phone]; OK to leave message on voicemail  ---------------------------------------------------------------------------  --------------

## 2024-02-08 ENCOUNTER — OFFICE VISIT (OUTPATIENT)
Facility: CLINIC | Age: 59
End: 2024-02-08
Payer: COMMERCIAL

## 2024-02-08 VITALS
RESPIRATION RATE: 20 BRPM | BODY MASS INDEX: 24.59 KG/M2 | DIASTOLIC BLOOD PRESSURE: 79 MMHG | SYSTOLIC BLOOD PRESSURE: 112 MMHG | HEART RATE: 86 BPM | HEIGHT: 66 IN | TEMPERATURE: 97.9 F | OXYGEN SATURATION: 98 % | WEIGHT: 153 LBS

## 2024-02-08 DIAGNOSIS — F32.A ANXIETY AND DEPRESSION: ICD-10-CM

## 2024-02-08 DIAGNOSIS — E87.6 HYPOKALEMIA: ICD-10-CM

## 2024-02-08 DIAGNOSIS — R79.89 LOW TSH LEVEL: Primary | ICD-10-CM

## 2024-02-08 DIAGNOSIS — F41.9 ANXIETY AND DEPRESSION: ICD-10-CM

## 2024-02-08 DIAGNOSIS — R73.03 PREDIABETES: ICD-10-CM

## 2024-02-08 DIAGNOSIS — R07.9 CHEST PAIN, UNSPECIFIED TYPE: ICD-10-CM

## 2024-02-08 LAB — HBA1C MFR BLD: 5.6 %

## 2024-02-08 PROCEDURE — 3074F SYST BP LT 130 MM HG: CPT

## 2024-02-08 PROCEDURE — 99214 OFFICE O/P EST MOD 30 MIN: CPT

## 2024-02-08 PROCEDURE — 3078F DIAST BP <80 MM HG: CPT

## 2024-02-08 PROCEDURE — 83036 HEMOGLOBIN GLYCOSYLATED A1C: CPT

## 2024-02-08 NOTE — PROGRESS NOTES
Chief Complaint   Patient presents with    ED Follow-up    Chest Pain     Pt not having chest pain today      Follow up     Pt having a lot hot flashes pt think she is going thru menopause. Pt would like to have lab done to check     1. Have you been to the ER, urgent care clinic since your last visit?  Hospitalized since your last visit?No    2. Have you seen or consulted any other health care providers outside of the [facility] since your last visit?  Include any pap smears or colon screening. No

## 2024-02-09 LAB
POTASSIUM SERPL-SCNC: 4.7 MMOL/L (ref 3.5–5.2)
T4 FREE SERPL-MCNC: 1.45 NG/DL (ref 0.82–1.77)
TSH SERPL DL<=0.005 MIU/L-ACNC: 0.43 UIU/ML (ref 0.45–4.5)

## 2024-03-10 DIAGNOSIS — R79.89 LOW TSH LEVEL: Primary | ICD-10-CM

## 2024-03-15 ENCOUNTER — HOSPITAL ENCOUNTER (OUTPATIENT)
Facility: HOSPITAL | Age: 59
End: 2024-03-15
Payer: COMMERCIAL

## 2024-03-15 DIAGNOSIS — R79.89 LOW TSH LEVEL: ICD-10-CM

## 2024-03-15 PROCEDURE — 76536 US EXAM OF HEAD AND NECK: CPT

## 2024-03-16 LAB — T3 SERPL-MCNC: 121 NG/DL (ref 71–180)

## 2024-03-17 LAB
THYROGLOB AB SERPL-ACNC: <1 IU/ML (ref 0–0.9)
THYROPEROXIDASE AB SERPL-ACNC: 21 IU/ML (ref 0–34)

## 2024-03-26 ENCOUNTER — TELEPHONE (OUTPATIENT)
Facility: CLINIC | Age: 59
End: 2024-03-26

## 2024-03-26 NOTE — TELEPHONE ENCOUNTER
Informed patient of US results and recommendations per KARY Nogueira NP.  Gave her Dr. Mckay's phone number to schedule appointment.  Patient stated her BP has been running around 109/67, 114/76 and in that range.  She stated she only takes the second BP med every now and then.  She is requesting a call from KARY Nogueira NP because she is concerned that her BP is running to low.

## 2024-03-26 NOTE — TELEPHONE ENCOUNTER
----- Message from Concetta Link sent at 3/26/2024 10:10 AM EDT -----  Subject: Message to Provider    QUESTIONS  Information for Provider? Pt would like a call back from Nanette Nogueira   about her BP and thyroids. stated was suppose to receive a call and   didn't.   ---------------------------------------------------------------------------  --------------  CALL BACK INFO  [phone]; OK to leave message on voicemail  ---------------------------------------------------------------------------  --------------  SCRIPT ANSWERS  Relationship to Patient? Self

## 2024-05-09 ENCOUNTER — OFFICE VISIT (OUTPATIENT)
Facility: CLINIC | Age: 59
End: 2024-05-09
Payer: COMMERCIAL

## 2024-05-09 VITALS
SYSTOLIC BLOOD PRESSURE: 134 MMHG | RESPIRATION RATE: 20 BRPM | TEMPERATURE: 98.3 F | DIASTOLIC BLOOD PRESSURE: 82 MMHG | BODY MASS INDEX: 24.75 KG/M2 | HEIGHT: 66 IN | OXYGEN SATURATION: 98 % | WEIGHT: 154 LBS | HEART RATE: 74 BPM

## 2024-05-09 DIAGNOSIS — G43.019 INTRACTABLE MIGRAINE WITHOUT AURA AND WITHOUT STATUS MIGRAINOSUS: ICD-10-CM

## 2024-05-09 DIAGNOSIS — R16.0 LIVER MASS: Primary | ICD-10-CM

## 2024-05-09 DIAGNOSIS — I10 ESSENTIAL (PRIMARY) HYPERTENSION: ICD-10-CM

## 2024-05-09 DIAGNOSIS — E78.2 MIXED HYPERLIPIDEMIA: ICD-10-CM

## 2024-05-09 DIAGNOSIS — M48.02 CERVICAL STENOSIS OF SPINE: ICD-10-CM

## 2024-05-09 DIAGNOSIS — R79.89 LOW TSH LEVEL: ICD-10-CM

## 2024-05-09 DIAGNOSIS — F32.A ANXIETY AND DEPRESSION: ICD-10-CM

## 2024-05-09 DIAGNOSIS — R07.9 CHEST PAIN, UNSPECIFIED TYPE: ICD-10-CM

## 2024-05-09 DIAGNOSIS — K21.9 GASTROESOPHAGEAL REFLUX DISEASE, UNSPECIFIED WHETHER ESOPHAGITIS PRESENT: ICD-10-CM

## 2024-05-09 DIAGNOSIS — F41.9 ANXIETY AND DEPRESSION: ICD-10-CM

## 2024-05-09 PROBLEM — R73.03 PREDIABETES: Status: ACTIVE | Noted: 2024-05-09

## 2024-05-09 PROCEDURE — 3077F SYST BP >= 140 MM HG: CPT

## 2024-05-09 PROCEDURE — 99214 OFFICE O/P EST MOD 30 MIN: CPT

## 2024-05-09 PROCEDURE — 3079F DIAST BP 80-89 MM HG: CPT

## 2024-05-09 RX ORDER — PREDNISONE 20 MG/1
TABLET ORAL
Qty: 9 TABLET | Refills: 0 | Status: SHIPPED | OUTPATIENT
Start: 2024-05-09

## 2024-05-09 ASSESSMENT — PATIENT HEALTH QUESTIONNAIRE - PHQ9
SUM OF ALL RESPONSES TO PHQ QUESTIONS 1-9: 0
1. LITTLE INTEREST OR PLEASURE IN DOING THINGS: NOT AT ALL
2. FEELING DOWN, DEPRESSED OR HOPELESS: NOT AT ALL
SUM OF ALL RESPONSES TO PHQ9 QUESTIONS 1 & 2: 0

## 2024-05-09 ASSESSMENT — ENCOUNTER SYMPTOMS
GASTROINTESTINAL NEGATIVE: 1
RESPIRATORY NEGATIVE: 1

## 2024-05-10 LAB
ALBUMIN SERPL-MCNC: 4.4 G/DL (ref 3.8–4.9)
ALBUMIN/GLOB SERPL: 1.6 {RATIO} (ref 1.2–2.2)
ALP SERPL-CCNC: 118 IU/L (ref 44–121)
ALT SERPL-CCNC: 30 IU/L (ref 0–32)
APPEARANCE UR: CLEAR
AST SERPL-CCNC: 28 IU/L (ref 0–40)
BACTERIA #/AREA URNS HPF: NORMAL /[HPF]
BASOPHILS # BLD AUTO: 0 X10E3/UL (ref 0–0.2)
BASOPHILS NFR BLD AUTO: 1 %
BILIRUB SERPL-MCNC: 0.4 MG/DL (ref 0–1.2)
BILIRUB UR QL STRIP: NEGATIVE
BUN SERPL-MCNC: 12 MG/DL (ref 6–24)
BUN/CREAT SERPL: 13 (ref 9–23)
CALCIUM SERPL-MCNC: 10 MG/DL (ref 8.7–10.2)
CASTS URNS QL MICRO: NORMAL /LPF
CHLORIDE SERPL-SCNC: 104 MMOL/L (ref 96–106)
CHOLEST SERPL-MCNC: 168 MG/DL (ref 100–199)
CO2 SERPL-SCNC: 22 MMOL/L (ref 20–29)
COLOR UR: YELLOW
CREAT SERPL-MCNC: 0.89 MG/DL (ref 0.57–1)
EGFRCR SERPLBLD CKD-EPI 2021: 75 ML/MIN/1.73
EOSINOPHIL # BLD AUTO: 0.1 X10E3/UL (ref 0–0.4)
EOSINOPHIL NFR BLD AUTO: 1 %
EPI CELLS #/AREA URNS HPF: NORMAL /HPF (ref 0–10)
ERYTHROCYTE [DISTWIDTH] IN BLOOD BY AUTOMATED COUNT: 13 % (ref 11.7–15.4)
GLOBULIN SER CALC-MCNC: 2.8 G/DL (ref 1.5–4.5)
GLUCOSE SERPL-MCNC: 85 MG/DL (ref 70–99)
GLUCOSE UR QL STRIP: NEGATIVE
HCT VFR BLD AUTO: 45.2 % (ref 34–46.6)
HDLC SERPL-MCNC: 68 MG/DL
HGB BLD-MCNC: 15.1 G/DL (ref 11.1–15.9)
HGB UR QL STRIP: NEGATIVE
IMM GRANULOCYTES # BLD AUTO: 0 X10E3/UL (ref 0–0.1)
IMM GRANULOCYTES NFR BLD AUTO: 0 %
KETONES UR QL STRIP: NEGATIVE
LDLC SERPL CALC-MCNC: 90 MG/DL (ref 0–99)
LEUKOCYTE ESTERASE UR QL STRIP: NEGATIVE
LYMPHOCYTES # BLD AUTO: 1.8 X10E3/UL (ref 0.7–3.1)
LYMPHOCYTES NFR BLD AUTO: 41 %
MCH RBC QN AUTO: 30.9 PG (ref 26.6–33)
MCHC RBC AUTO-ENTMCNC: 33.4 G/DL (ref 31.5–35.7)
MCV RBC AUTO: 93 FL (ref 79–97)
MICRO URNS: NORMAL
MICRO URNS: NORMAL
MONOCYTES # BLD AUTO: 0.3 X10E3/UL (ref 0.1–0.9)
MONOCYTES NFR BLD AUTO: 7 %
NEUTROPHILS # BLD AUTO: 2.2 X10E3/UL (ref 1.4–7)
NEUTROPHILS NFR BLD AUTO: 50 %
NITRITE UR QL STRIP: NEGATIVE
PH UR STRIP: 6 [PH] (ref 5–7.5)
PLATELET # BLD AUTO: 207 X10E3/UL (ref 150–450)
POTASSIUM SERPL-SCNC: 4.8 MMOL/L (ref 3.5–5.2)
PROT SERPL-MCNC: 7.2 G/DL (ref 6–8.5)
PROT UR QL STRIP: NEGATIVE
RBC # BLD AUTO: 4.88 X10E6/UL (ref 3.77–5.28)
RBC #/AREA URNS HPF: NORMAL /HPF (ref 0–2)
SODIUM SERPL-SCNC: 142 MMOL/L (ref 134–144)
SP GR UR STRIP: 1.01 (ref 1–1.03)
T4 FREE SERPL-MCNC: 1.43 NG/DL (ref 0.82–1.77)
TRIGL SERPL-MCNC: 48 MG/DL (ref 0–149)
TSH SERPL DL<=0.005 MIU/L-ACNC: 0.6 UIU/ML (ref 0.45–4.5)
UROBILINOGEN UR STRIP-MCNC: 0.2 MG/DL (ref 0.2–1)
VLDLC SERPL CALC-MCNC: 10 MG/DL (ref 5–40)
WBC # BLD AUTO: 4.4 X10E3/UL (ref 3.4–10.8)
WBC #/AREA URNS HPF: NORMAL /HPF (ref 0–5)

## 2024-05-28 ENCOUNTER — OFFICE VISIT (OUTPATIENT)
Age: 59
End: 2024-05-28
Payer: COMMERCIAL

## 2024-05-28 VITALS
HEART RATE: 76 BPM | OXYGEN SATURATION: 98 % | BODY MASS INDEX: 25.07 KG/M2 | WEIGHT: 156 LBS | TEMPERATURE: 97.8 F | RESPIRATION RATE: 20 BRPM | DIASTOLIC BLOOD PRESSURE: 89 MMHG | HEIGHT: 66 IN | SYSTOLIC BLOOD PRESSURE: 140 MMHG

## 2024-05-28 DIAGNOSIS — E04.1 THYROID NODULE: Primary | ICD-10-CM

## 2024-05-28 DIAGNOSIS — I10 ESSENTIAL (PRIMARY) HYPERTENSION: ICD-10-CM

## 2024-05-28 DIAGNOSIS — R94.6 BORDERLINE ABNORMAL TFTS: ICD-10-CM

## 2024-05-28 PROCEDURE — 99203 OFFICE O/P NEW LOW 30 MIN: CPT | Performed by: STUDENT IN AN ORGANIZED HEALTH CARE EDUCATION/TRAINING PROGRAM

## 2024-05-28 PROCEDURE — 3077F SYST BP >= 140 MM HG: CPT | Performed by: STUDENT IN AN ORGANIZED HEALTH CARE EDUCATION/TRAINING PROGRAM

## 2024-05-28 PROCEDURE — 3079F DIAST BP 80-89 MM HG: CPT | Performed by: STUDENT IN AN ORGANIZED HEALTH CARE EDUCATION/TRAINING PROGRAM

## 2024-05-28 RX ORDER — METOPROLOL SUCCINATE 25 MG/1
25 TABLET, EXTENDED RELEASE ORAL DAILY
Qty: 90 TABLET | Refills: 0 | Status: SHIPPED | OUTPATIENT
Start: 2024-05-28

## 2024-05-28 NOTE — PROGRESS NOTES
1. \"Have you been to the ER, urgent care clinic since your last visit?  Hospitalized since your last visit?\" nO    2. \"Have you seen or consulted any other health care providers outside of the [facility] since your last visit?\" nO    3. For patients aged 45-75: Has the patient had a colonoscopy / FIT/ Cologuard? Yes      If the patient is female:    4. For patients aged 40-74: Has the patient had a mammogram within the past 2 years? Yes      5. For patients aged 21-65: Has the patient had a pap smear? Yes    Chief Complaint   Patient presents with    New Patient    Thyroid Problem     BP (!) 158/95 (Site: Left Upper Arm, Position: Sitting, Cuff Size: Medium Adult)   Pulse 76   Temp 97.8 °F (36.6 °C) (Oral)   Resp 20   Ht 1.676 m (5' 6\")   Wt 70.8 kg (156 lb)   SpO2 98%   BMI 25.18 kg/m²     BP (!) 140/89 (Site: Right Upper Arm, Position: Sitting, Cuff Size: Medium Adult)   Pulse 76   Temp 97.8 °F (36.6 °C) (Oral)   Resp 20   Ht 1.676 m (5' 6\")   Wt 70.8 kg (156 lb)   SpO2 98%   BMI 25.18 kg/m²

## 2024-05-28 NOTE — PROGRESS NOTES
[facility]                                                                                    Ariela Pacheco M.D          Patient Information  Date:5/28/2024  Name : Sonia Dejesus 58 y.o.     YOB: 1965         Referred by: Nanette Nogueira APRN - [place]       Chief Complaint   Patient presents with    New Patient    Thyroid Problem       History of Present Illness: Sonia Dejesus is a 58 y.o. female with hypertension, prediabetes, who presented to [facility] care.       Prior history of thyroid problems: no   Recent steroid treatments: yes for back pain   High dose Biotin supplemnts: no   Recent URI:   Tenderness in neck: no   Swelling in neck: no   Family history of thyroid problems:   Personal/family history of autoimmune diseases:   smoking:   Change in appearance of eyes/ redness/ eye irritation:  Personal history of cardiac disease:  Personal history of osteoporosis/fragility fractures:    Mother had brain tumor   Brosther has prostate cancer   2 sister breast cancer   Thyroid nodule noted incidentally during imaging.  Difficulty in swallowing (food/pills getting stuck): no  Difficulty in breathing:no   Visible swelling in the neck:no   Hoarseness of voice: no   Family history of thyroid cancer: yes- SISTER   Personal history of radiation exposure to head/neck region: no   Family/personal history of other cancers: no   Symptoms of hypo/hyperthyroidism:  Weight has fluctuated   No constipation or diarrhea.       Past Medical History:   Diagnosis Date    Anxiety     Chest pain     stress Echo  normal May 2013 in [facility]    Disc herniation 06/06/2013    Noted on MRI, C3-C6, assoc with stenosis but no cord compression    HTN (hypertension)     Hypertensive urgency     Menopause     Migraine     Psychiatric disorder        Past Surgical History:   Procedure Laterality Date    COLONOSCOPY N/A 3/21/2023    COLONOSCOPY performed by Tali Alaniz MD at [facility] ENDOSCOPY

## 2024-08-23 DIAGNOSIS — I10 ESSENTIAL (PRIMARY) HYPERTENSION: ICD-10-CM

## 2024-08-23 RX ORDER — METOPROLOL SUCCINATE 25 MG/1
25 TABLET, EXTENDED RELEASE ORAL DAILY
Qty: 90 TABLET | Refills: 0 | Status: SHIPPED | OUTPATIENT
Start: 2024-08-23

## 2024-08-28 DIAGNOSIS — E04.1 THYROID NODULE: ICD-10-CM

## 2024-11-04 ENCOUNTER — OFFICE VISIT (OUTPATIENT)
Facility: CLINIC | Age: 59
End: 2024-11-04
Payer: COMMERCIAL

## 2024-11-04 VITALS
BODY MASS INDEX: 23.11 KG/M2 | HEART RATE: 81 BPM | RESPIRATION RATE: 16 BRPM | OXYGEN SATURATION: 98 % | WEIGHT: 143.8 LBS | SYSTOLIC BLOOD PRESSURE: 143 MMHG | DIASTOLIC BLOOD PRESSURE: 98 MMHG | HEIGHT: 66 IN | TEMPERATURE: 98.5 F

## 2024-11-04 DIAGNOSIS — F41.9 ANXIETY AND DEPRESSION: ICD-10-CM

## 2024-11-04 DIAGNOSIS — R16.0 LIVER MASS: ICD-10-CM

## 2024-11-04 DIAGNOSIS — Z12.31 ENCOUNTER FOR SCREENING MAMMOGRAM FOR MALIGNANT NEOPLASM OF BREAST: ICD-10-CM

## 2024-11-04 DIAGNOSIS — I10 ESSENTIAL (PRIMARY) HYPERTENSION: ICD-10-CM

## 2024-11-04 DIAGNOSIS — R73.03 PREDIABETES: Primary | ICD-10-CM

## 2024-11-04 DIAGNOSIS — F32.A ANXIETY AND DEPRESSION: ICD-10-CM

## 2024-11-04 DIAGNOSIS — E78.2 MIXED HYPERLIPIDEMIA: ICD-10-CM

## 2024-11-04 DIAGNOSIS — E04.1 THYROID NODULE: ICD-10-CM

## 2024-11-04 DIAGNOSIS — K21.9 GASTROESOPHAGEAL REFLUX DISEASE, UNSPECIFIED WHETHER ESOPHAGITIS PRESENT: ICD-10-CM

## 2024-11-04 LAB — HBA1C MFR BLD: 5.4 %

## 2024-11-04 PROCEDURE — 83036 HEMOGLOBIN GLYCOSYLATED A1C: CPT

## 2024-11-04 PROCEDURE — 3077F SYST BP >= 140 MM HG: CPT

## 2024-11-04 PROCEDURE — 3080F DIAST BP >= 90 MM HG: CPT

## 2024-11-04 PROCEDURE — 99214 OFFICE O/P EST MOD 30 MIN: CPT

## 2024-11-04 RX ORDER — AMLODIPINE BESYLATE 5 MG/1
5 TABLET ORAL DAILY
Qty: 90 TABLET | Refills: 1 | Status: SHIPPED | OUTPATIENT
Start: 2024-11-04 | End: 2024-11-04 | Stop reason: ALTCHOICE

## 2024-11-04 RX ORDER — PANTOPRAZOLE SODIUM 40 MG/1
40 TABLET, DELAYED RELEASE ORAL DAILY
Qty: 90 TABLET | Refills: 1 | Status: SHIPPED | OUTPATIENT
Start: 2024-11-04

## 2024-11-04 RX ORDER — AMLODIPINE BESYLATE 10 MG/1
10 TABLET ORAL DAILY
Qty: 90 TABLET | Refills: 1 | Status: SHIPPED | OUTPATIENT
Start: 2024-11-04 | End: 2024-11-04 | Stop reason: ALTCHOICE

## 2024-11-04 RX ORDER — METOPROLOL SUCCINATE 25 MG/1
25 TABLET, EXTENDED RELEASE ORAL DAILY
Qty: 90 TABLET | Refills: 1 | Status: SHIPPED | OUTPATIENT
Start: 2024-11-04

## 2024-11-04 RX ORDER — AMLODIPINE BESYLATE 5 MG/1
5 TABLET ORAL DAILY
Qty: 90 TABLET | Refills: 1 | Status: SHIPPED | OUTPATIENT
Start: 2024-11-04

## 2024-11-04 RX ORDER — HYDROXYZINE HYDROCHLORIDE 25 MG/1
25 TABLET, FILM COATED ORAL EVERY 8 HOURS PRN
Qty: 90 TABLET | Refills: 1 | Status: SHIPPED | OUTPATIENT
Start: 2024-11-04

## 2024-11-04 NOTE — PROGRESS NOTES
Sonia Dejesus is a 59 y.o. female who presents to the office today for the following:    Chief Complaint   Patient presents with    Blood Pressure Check    Follow-up       Past Medical History:   Diagnosis Date    Anxiety     Chest pain     stress Echo  normal May 2013 in [facility]    Disc herniation 06/06/2013    Noted on MRI, C3-C6, assoc with stenosis but no cord compression    HTN (hypertension)     Hypertensive urgency     Menopause     Migraine     Psychiatric disorder         Past Surgical History:   Procedure Laterality Date    COLONOSCOPY N/A 3/21/2023    COLONOSCOPY performed by Tali Alaniz MD at [facility] ENDOSCOPY    COLONOSCOPY          Family History   Problem Relation Age of Onset    Cancer Mother         brain    Hypertension Mother     Stroke Mother     Cancer Sister         breast    Hypertension Sister     Breast Cancer Sister     Breast Cancer Sister     Hypertension Sister     Stroke Father     Hypertension Father     Hypertension Sister         Social History     Tobacco Use    Smoking status: Some Days     Current packs/day: 0.25     Types: Cigarettes    Smokeless tobacco: Never   Vaping Use    Vaping status: Never Used   Substance Use Topics    Alcohol use: Never    Drug use: No        HPI  Patient here for follow up of chronic conditions with PMH  HTN, GERD, palpitations, prediabetes, insomnia, anxiety/depression, thyroid nodule, DDD cspine, migraines and benign frontal lobe lesion. Follows with Neurosurgery, [facility] and [facility]. Slight elevation of TSH and nodule - currently being monitored with repeat US due 5/2025 for close follow due to sister hx thyroid CA. States her BP has been elevated at home and has noticed increased frequency of headaches that gradually worsen throughout the day. Home readings > 150 systolic. Takes medications as directed.    Chief Complaint   Patient presents with    Blood Pressure Check    Follow-up       Current Outpatient Medications on File

## 2024-11-04 NOTE — PROGRESS NOTES
Chief Complaint   Patient presents with    Blood Pressure Check    Follow-up     \"Have you been to the ER, urgent care clinic since your last visit?  Hospitalized since your last visit?\"    NO    “Have you seen or consulted any other health care providers outside our system since your last visit?”    NO

## 2024-11-05 LAB
ALBUMIN SERPL-MCNC: 4.5 G/DL (ref 3.8–4.9)
ALP SERPL-CCNC: 101 IU/L (ref 44–121)
ALT SERPL-CCNC: 20 IU/L (ref 0–32)
APPEARANCE UR: CLEAR
AST SERPL-CCNC: 23 IU/L (ref 0–40)
BACTERIA #/AREA URNS HPF: ABNORMAL /[HPF]
BASOPHILS # BLD AUTO: 0 X10E3/UL (ref 0–0.2)
BASOPHILS NFR BLD AUTO: 1 %
BILIRUB SERPL-MCNC: 0.4 MG/DL (ref 0–1.2)
BILIRUB UR QL STRIP: NEGATIVE
BUN SERPL-MCNC: 10 MG/DL (ref 8–27)
BUN/CREAT SERPL: 11 (ref 12–28)
CALCIUM SERPL-MCNC: 9.7 MG/DL (ref 8.7–10.3)
CASTS URNS QL MICRO: ABNORMAL /LPF
CHLORIDE SERPL-SCNC: 103 MMOL/L (ref 96–106)
CHOLEST SERPL-MCNC: 180 MG/DL (ref 100–199)
CO2 SERPL-SCNC: 19 MMOL/L (ref 20–29)
COLOR UR: YELLOW
CREAT SERPL-MCNC: 0.9 MG/DL (ref 0.57–1)
EGFRCR SERPLBLD CKD-EPI 2021: 73 ML/MIN/1.73
EOSINOPHIL # BLD AUTO: 0.1 X10E3/UL (ref 0–0.4)
EOSINOPHIL NFR BLD AUTO: 2 %
EPI CELLS #/AREA URNS HPF: ABNORMAL /HPF (ref 0–10)
ERYTHROCYTE [DISTWIDTH] IN BLOOD BY AUTOMATED COUNT: 12.8 % (ref 11.7–15.4)
GLOBULIN SER CALC-MCNC: 3.2 G/DL (ref 1.5–4.5)
GLUCOSE SERPL-MCNC: 79 MG/DL (ref 70–99)
GLUCOSE UR QL STRIP: NEGATIVE
HBA1C MFR BLD: 5.6 % (ref 4.8–5.6)
HCT VFR BLD AUTO: 48.8 % (ref 34–46.6)
HDLC SERPL-MCNC: 66 MG/DL
HGB BLD-MCNC: 15.9 G/DL (ref 11.1–15.9)
HGB UR QL STRIP: ABNORMAL
IMM GRANULOCYTES # BLD AUTO: 0 X10E3/UL (ref 0–0.1)
IMM GRANULOCYTES NFR BLD AUTO: 0 %
KETONES UR QL STRIP: NEGATIVE
LDLC SERPL CALC-MCNC: 97 MG/DL (ref 0–99)
LEUKOCYTE ESTERASE UR QL STRIP: NEGATIVE
LYMPHOCYTES # BLD AUTO: 1.7 X10E3/UL (ref 0.7–3.1)
LYMPHOCYTES NFR BLD AUTO: 44 %
MCH RBC QN AUTO: 31.1 PG (ref 26.6–33)
MCHC RBC AUTO-ENTMCNC: 32.6 G/DL (ref 31.5–35.7)
MCV RBC AUTO: 96 FL (ref 79–97)
MICRO URNS: ABNORMAL
MONOCYTES # BLD AUTO: 0.2 X10E3/UL (ref 0.1–0.9)
MONOCYTES NFR BLD AUTO: 6 %
NEUTROPHILS # BLD AUTO: 1.8 X10E3/UL (ref 1.4–7)
NEUTROPHILS NFR BLD AUTO: 47 %
NITRITE UR QL STRIP: NEGATIVE
PH UR STRIP: 5.5 [PH] (ref 5–7.5)
PLATELET # BLD AUTO: 187 X10E3/UL (ref 150–450)
POTASSIUM SERPL-SCNC: 4.2 MMOL/L (ref 3.5–5.2)
PROT SERPL-MCNC: 7.7 G/DL (ref 6–8.5)
PROT UR QL STRIP: NEGATIVE
RBC # BLD AUTO: 5.11 X10E6/UL (ref 3.77–5.28)
RBC #/AREA URNS HPF: ABNORMAL /HPF (ref 0–2)
SODIUM SERPL-SCNC: 141 MMOL/L (ref 134–144)
SP GR UR STRIP: 1.02 (ref 1–1.03)
T4 FREE SERPL-MCNC: 1.45 NG/DL (ref 0.82–1.77)
TRIGL SERPL-MCNC: 96 MG/DL (ref 0–149)
TSH SERPL DL<=0.005 MIU/L-ACNC: 0.57 UIU/ML (ref 0.45–4.5)
UROBILINOGEN UR STRIP-MCNC: 0.2 MG/DL (ref 0.2–1)
VLDLC SERPL CALC-MCNC: 17 MG/DL (ref 5–40)
WBC # BLD AUTO: 3.9 X10E3/UL (ref 3.4–10.8)
WBC #/AREA URNS HPF: ABNORMAL /HPF (ref 0–5)

## 2024-11-05 ASSESSMENT — ENCOUNTER SYMPTOMS
ABDOMINAL PAIN: 0
SINUS PAIN: 0
EYE PAIN: 0
NAUSEA: 0
RHINORRHEA: 0
BLOOD IN STOOL: 0
BACK PAIN: 0
VOMITING: 0
SORE THROAT: 0
DIARRHEA: 0
CONSTIPATION: 0

## 2024-11-26 ENCOUNTER — TELEPHONE (OUTPATIENT)
Facility: CLINIC | Age: 59
End: 2024-11-26

## 2024-11-26 NOTE — TELEPHONE ENCOUNTER
Pt cld & needs amlodipine  sent to [facility] in [place], [place].  She is out of medication.

## 2024-12-03 ENCOUNTER — HOSPITAL ENCOUNTER (OUTPATIENT)
Facility: HOSPITAL | Age: 59
Discharge: HOME | End: 2024-12-06
Payer: COMMERCIAL

## 2024-12-03 VITALS — HEIGHT: 66 IN | WEIGHT: 143 LBS | BODY MASS INDEX: 22.98 KG/M2

## 2024-12-03 DIAGNOSIS — I10 ESSENTIAL (PRIMARY) HYPERTENSION: ICD-10-CM

## 2024-12-03 DIAGNOSIS — Z12.31 ENCOUNTER FOR SCREENING MAMMOGRAM FOR MALIGNANT NEOPLASM OF BREAST: ICD-10-CM

## 2024-12-03 PROCEDURE — 77063 BREAST TOMOSYNTHESIS BI: CPT

## 2024-12-03 RX ORDER — METOPROLOL SUCCINATE 25 MG/1
25 TABLET, EXTENDED RELEASE ORAL DAILY
Qty: 90 TABLET | Refills: 1 | Status: SHIPPED | OUTPATIENT
Start: 2024-12-03

## 2024-12-03 RX ORDER — AMLODIPINE BESYLATE 5 MG/1
5 TABLET ORAL DAILY
Qty: 90 TABLET | Refills: 1 | Status: SHIPPED | OUTPATIENT
Start: 2024-12-03

## 2024-12-30 DIAGNOSIS — F32.A ANXIETY AND DEPRESSION: ICD-10-CM

## 2024-12-30 DIAGNOSIS — F41.9 ANXIETY AND DEPRESSION: ICD-10-CM

## 2024-12-31 RX ORDER — HYDROXYZINE HYDROCHLORIDE 25 MG/1
25 TABLET, FILM COATED ORAL EVERY 8 HOURS PRN
Qty: 90 TABLET | Refills: 1 | Status: SHIPPED | OUTPATIENT
Start: 2024-12-31

## 2025-06-06 DIAGNOSIS — I10 ESSENTIAL (PRIMARY) HYPERTENSION: ICD-10-CM

## 2025-06-08 RX ORDER — AMLODIPINE BESYLATE 5 MG/1
5 TABLET ORAL DAILY
Qty: 90 TABLET | Refills: 1 | Status: SHIPPED | OUTPATIENT
Start: 2025-06-08

## 2025-07-14 ENCOUNTER — TELEPHONE (OUTPATIENT)
Facility: CLINIC | Age: 60
End: 2025-07-14

## (undated) DEVICE — PAD,PREPPING,CUFFED,24X48,7",NONSTERILE: Brand: MEDLINE

## (undated) DEVICE — SOLUTION IRRIG 1000ML STRL H2O USP PLAS POUR BTL

## (undated) DEVICE — 1200CC GUARDIAN II: Brand: GUARDIAN

## (undated) DEVICE — WASH CLOTH INCONT LO LINT PREM 12X13 IN LF DISP

## (undated) DEVICE — LINE SAMPLING AD NSL O2 TBNG MICROSTREAM ADV FILTERLINE MVAO

## (undated) DEVICE — JELLY,LUBE,STERILE,FLIP TOP,TUBE,4-OZ: Brand: MEDLINE

## (undated) DEVICE — SPONGE GZ W4XL4IN COT 12 PLY TYP VII WVN C FLD DSGN STERILE

## (undated) DEVICE — TUBING, SUCTION, 9/32" X 10', STRAIGHT: Brand: MEDLINE

## (undated) DEVICE — FCPS RAD JAW 4LC 240CM W/NDL -- BX/20 RADIAL JAW 4

## (undated) DEVICE — GLOVE ORANGE PI 7 1/2   MSG9075